# Patient Record
Sex: FEMALE | NOT HISPANIC OR LATINO | Employment: FULL TIME | URBAN - METROPOLITAN AREA
[De-identification: names, ages, dates, MRNs, and addresses within clinical notes are randomized per-mention and may not be internally consistent; named-entity substitution may affect disease eponyms.]

---

## 2021-05-07 ENCOUNTER — TRANSFERRED RECORDS (OUTPATIENT)
Dept: HEALTH INFORMATION MANAGEMENT | Facility: CLINIC | Age: 66
End: 2021-05-07

## 2021-05-11 ENCOUNTER — TRANSCRIBE ORDERS (OUTPATIENT)
Dept: OTHER | Age: 66
End: 2021-05-11

## 2021-05-11 DIAGNOSIS — C55 ADENOCARCINOMA OF UTERUS (H): Primary | ICD-10-CM

## 2021-05-12 ENCOUNTER — PRE VISIT (OUTPATIENT)
Dept: ONCOLOGY | Facility: CLINIC | Age: 66
End: 2021-05-12

## 2021-05-12 NOTE — TELEPHONE ENCOUNTER
RECORDS STATUS - ALL OTHER DIAGNOSIS      RECORDS RECEIVED FROM: [facility]   DATE RECEIVED:    NOTES STATUS DETAILS   OFFICE NOTE from referring provider CE - [facility] Dr. Dary Jones: 5/7/21   OFFICE NOTE from medical oncologist     DISCHARGE SUMMARY from hospital     DISCHARGE REPORT from the ER     OPERATIVE REPORT NA    MEDICATION LIST     CLINICAL TRIAL TREATMENTS TO DATE     LABS     PATHOLOGY REPORTS NA    ANYTHING RELATED TO DIAGNOSIS Epic/CE 5/4/21: PAP   GENONOMIC TESTING     TYPE:     IMAGING (NEED IMAGES & REPORT)     CT SCANS     MRI     MAMMO     ULTRASOUND PACS 5/5/21: [facility]   PET

## 2021-05-13 NOTE — TELEPHONE ENCOUNTER
RECORDS STATUS - ALL OTHER DIAGNOSIS      RECORDS RECEIVED FROM:[facility]/ [facility]/ [facility]    DATE RECEIVED: 5/17/2021   NOTES STATUS DETAILS   OFFICE NOTE from referring provider Complete Dary Mendoza MD   OFFICE NOTE from medical oncologist N/A - [facility] Office Visit 5/7/2021 ([facility]) Post- menopausal bleeding    DISCHARGE SUMMARY from hospital N/A    DISCHARGE REPORT from the ER     OPERATIVE REPORT Complete Pap Smear   [facility]  Case: [id number]         Adenocarcinoma, endometrial   MEDICATION LIST Complete CE   CLINICAL TRIAL TREATMENTS TO DATE     LABS     PATHOLOGY REPORTS N/A    ANYTHING RELATED TO DIAGNOSIS Complete    Requested- [facility]   Fax: [phone] Labs last updated in CE on 5/4/2021    Pap Smear   [facility]  Case: [id number]         Adenocarcinoma, endometrial   GENONOMIC TESTING     TYPE:     IMAGING (NEED IMAGES & REPORT)     CT SCANS     MRI     MAMMO     ULTRASOUND Complete- [facility] US Pelvis 5/5/2021    PET       Action    Action Taken 5/14/2021 9:09AM     I called rocio Frances - her phone went to .     I called [facility] to check on the cytology path slides  Ph: [phone]  #7 #2 - I left a detailed  requesting a call back.     I called [facility] Ph:[phone] - the pap smear/cytology slides are at [facility].

## 2021-05-15 NOTE — PROGRESS NOTES
Gynecologic Oncology Clinic - New Patient    Referring provider:    Dary Mendoza MD    Patient: Yvrose Andrade  : 1955    Date of Visit: May 17, 2021     Chief Complaint: endometrial cancer    History of Present Illness:  Yvrose Andrade is a 66 year old post-menopausal female who presents for newly diagnosed endometrial cancer.     Per review of her Family Medicine documentation and results, she presented to PCP with a week of vaginal bleeding and cramping. Ultrasound showed 1.5cm lining with probable anterior wall invasion. Pap obtained.    She was planned to undergo hysteroscopy, D&C with OB/Gyn; however, prior to planned procedure her Pap smear returned with endometrial adenocarcinoma and thus procedure was cancelled and she was referred here.    She is here with her fiance who aids in history. She has a history of stroke which has slowed her processing and speech. She corroborates above history. Continues to have very light bleeding. She has some cramping. No change in bladder or bowel habits. No chest pain, SOB, nausea, vomiting, LE swelling.     Review of Systems:  As per HPI, otherwise non-contributory.     Past Medical History  Past Medical History:   Diagnosis Date     Anxiety      Depression      Essential hypertension      Stroke (H)      Type 2 diabetes mellitus without complication, without long-term current use of insulin (H)        Past Surgical History  Past Surgical History:   Procedure Laterality Date     CORNEAL GRAFTS Bilateral      TONSILLECTOMY          OB/Gynecologic History:  g0  Menopause age 56, no prior episodes of bleeding  Last Pap Smear: 2021, endometrial cancer. History of abnormal: No    Social History  Social History     Tobacco Use     Smoking status: Never Smoker     Smokeless tobacco: Never Used   Substance Use Topics     Alcohol use: Yes     Frequency: Monthly or less     Drug use: Never   She works as a , generally overseeing self check out. She is engaged.  "Her fiance, Price, drives trucks, so is away sometimes. Her sister, Vandana, also helps with her care.     Family History  Family History   Problem Relation Age of Onset     Colon Cancer Father 58       Current Outpatient Medications   Medication Sig Dispense Refill     amLODIPine (NORVASC) 5 MG tablet Take 1 tablet by mouth once daily       Aspirin Buf,CaCarb-MgCarb-MgO, 81 MG TABS Take 1 tablet by mouth       calcium carbonate-vitamin D (OYSTER SHELL CALCIUM/D) 500-200 MG-UNIT tablet Take 1 tablet by mouth       citalopram (CELEXA) 20 MG tablet Take 1 tablet by mouth once daily       metFORMIN (GLUCOPHAGE-XR) 500 MG 24 hr tablet 1 tab with breakfast daily for one week then 1 tab twice a day with meals.       rosuvastatin (CRESTOR) 20 MG tablet TAKE 1 TABLET BY MOUTH AT BEDTIME            Allergies  Allergies   Allergen Reactions     Bees      bee stings        Preventive Care:  Mammogram: 8/25/2020  Colonoscopy: 2019, normal     Physical Exam:   BP (!) 144/73 (BP Location: Right arm)   Pulse 61   Temp 98  F (36.7  C) (Oral)   Resp 14   Ht 1.702 m (5' 7\")   Wt 71.8 kg (158 lb 6.4 oz)   SpO2 98%   BMI 24.81 kg/m    General appearance: Alert and oriented, no acute distress, well groomed  GI: Abdomen soft, non-tender, non-distended. No palpable masses  Skin: no skin changes or lesions  Psych: alert and oriented, appropriate affect  Lymph: No palpable lymphadenopathy in the neck, supraclavicular region, groin  Genitourinary:  External: anatomically normal appearing labia majora, minora, and clitoral ramsey. No lesions noted  Vagina: pale atrophic mucosa without lesions  Cervix: atrophic and nearly flush with the vagina, see below      Endometrial biopsy  Endometrial Biopsy    After patient identification was confirmed and informed consent was signed, a bivalve speculum was placed in the vagina for adequate visualization of the cervix.  The cervix was prepped with betadine. The cervix was grasped with a single-tooth " tenaculum to apply traction. Os finder was used and sequential attempt at dilation of the os was performed. Attempted endometrial biopsy, however, unable to pass into the endometrial canal, A scant amount of tissue was obtained, but given her diagnosis on Pap smear, the decision was made to send the tissue in the event it was able to provide a diagnosis. The tenaculum was removed, and the tenaculum site was made hemostatic with the application of pressure. The speculum was then removed. Specimen was sent for permanent pathology. The patient tolerated the procedure with moderate cramping.    Labs:   Component 13d ago   Case Report  Pap Smear                                         Case: [id number]                                   Authorizing Provider:  Celina Eddy PA-C    Collected:           05/04/2021 04:14 PM           Ordering Location:     [facility]   Received:            05/05/2021 04:14 PM                                  Clinic                                                                       First Screen:          Tamika Curiel                                                             Pathologist:           Melissa Rae MD                                                         Specimen:    Cervical Thin Prep with HPV Test Imager Screening, Cervix                               Interpretation  Adenocarcinoma, endometrial.Abnormal     Specimen Adequacy  Satisfactory for evaluation.  Endocervical/transformation zone component present.      Clinical Information  Abnormal Bleeding    LMP  10 years ago            Imaging:   EXAMINATION:  US PELVIS WITH IVT-NON OB    DATE: 5/5/2021 8:19 AM    CLINICAL DATA:  Postmenopausal bleeding    COMPARISON:  None.    TECHNIQUE:  Transabdominal and transvaginal sonography for better visualization of the endometrium.    FINDINGS: The uterus is anteflexed. The uterus measures 5.7 X 2.9 X 3.7 cm.  There is some fluid seen within the  endometrial cavity  The endometrium is heterogeneous and thickened irregularly measures 1.3 to 1.5 cm in thickness. It is hypervascular. It is difficult to define the anterior margin of the endometrium suggesting that this may be invading the myometrium. No fibroid or other abnormality is seen.    The right ovary measures 2 x 2 0.1 x 0.8 cm.  No right ovarian lesion is seen.  The left ovary measures 0.9 x 1.5 x 0.5 cm. No left ovarian lesion is seen. Normal blood flow seen to both ovaries. No free fluid is seen in the cul-de-sac.    Assessment:  Yvrose Andrade is a 66 year old with history of stroke with resultant speech deficits, HTN, and diabetes with recent Pap smear showing endometrial adeocarcinoma and ultrasound confirming endometrial origin.    Plan:   Endometrial cancer: We reviewed the pathophysiology of uterine cancer. We discussed this diagnosis and in particular her diagnosis, which was made with cytology on Pap smear. Because of this, we do not have a grade for her cancer. Biopsy was attempted in clinic, but cervical stenosis precluded entry into the canal and I suspect it may be non-diagnostic. We discussed the effect of grade on risk of cancer spread. Grade can sometimes be needed to guide intra-operative surgical decision making, but this information could be obtained on frozen section if needed, so I do not think it is necessary to perform D&C prior to proceeding with surgery. Her Pap smear cytology suggest endometrial origin and her exam and imaging are extremely consistent with a uterine cancer.   We discussed stage as determination of if the cancer has spread beyond this uterus. This is determined surgically. Other options for treatment include surgery or radiation therapy. Hormones are an option in some cases but are not standard of care. We discussed risks/benefits. Her co-morbid conditions that increase her surgical risk are her HTN, history of stroke, elevated BMI, and diabetes. However,  based on history and exam, I feel she is an appropriate surgical risk with clearance by her primary care provider.   I recommend total laparoscopic hysterectomy, bilateral salpingo-oophorectomy, ICG dye injection with sentinel lymph node removal, with possible full lymphadenectomy. This can generally be accomplished through a minimally invasive approach. Complications at the time of surgery could require conversion to an open procedure. We discussed surgical risks as below.  We reviewed the risks of surgery including risk of infection, bleeding, damage to surrounding structures or organs including bladder, ureters, bowel, blood vessels, or nerves. Risk of need for additional procedures or blood transfusion if complications. Blood transfusions carry additional risks of transfusion reactions, damaged to lung/kidneys, fevers, transmission of infectious disease, death. Risk of blood clot/pulmonary embolism, which can be fatal. We reviewed sentinel lymph node procedure and ~15% risk of needing full lymph node dissection. Lymph node resection brings risks of numbness of mons, upper thigh, vulva and lymphedema, which we think are reduced with sentinel lymph node dissection. I also reviewed the use of vaginal manipulator and vaginal specimen removal with risk of vaginal laceration/repair. I discussed the risk of vaginal cuff dehiscence (separation) or wound dehiscence with need for further surgery.   Pre-operative work-up: pre-operative exam will be completed by her primary care doctor or PAC if she does not have a PCP. No further imaging is necessary prior to surgery. Pre-operative teaching was completed by RN in clinic. She knows to expect a call from scheduling and/or pre-operative nursing with more specific instructions on date/time of surgery. We reviewed Covid testing and possibility of surgery being delayed with positive result.     I spent a total of 75 minutes on day of service including face to face time,  documentation, chart review/coordination of care.    Saeid Casillas MD

## 2021-05-17 ENCOUNTER — PRE VISIT (OUTPATIENT)
Dept: ONCOLOGY | Facility: CLINIC | Age: 66
End: 2021-05-17

## 2021-05-17 ENCOUNTER — ONCOLOGY VISIT (OUTPATIENT)
Dept: ONCOLOGY | Facility: CLINIC | Age: 66
End: 2021-05-17
Attending: OBSTETRICS & GYNECOLOGY
Payer: COMMERCIAL

## 2021-05-17 VITALS
DIASTOLIC BLOOD PRESSURE: 73 MMHG | HEART RATE: 61 BPM | SYSTOLIC BLOOD PRESSURE: 144 MMHG | WEIGHT: 158.4 LBS | OXYGEN SATURATION: 98 % | RESPIRATION RATE: 14 BRPM | HEIGHT: 67 IN | BODY MASS INDEX: 24.86 KG/M2 | TEMPERATURE: 98 F

## 2021-05-17 DIAGNOSIS — I63.81 RIGHT THALAMIC STROKE (H): ICD-10-CM

## 2021-05-17 DIAGNOSIS — C54.1 ENDOMETRIAL ADENOCARCINOMA (H): Primary | ICD-10-CM

## 2021-05-17 DIAGNOSIS — I10 ESSENTIAL HYPERTENSION: ICD-10-CM

## 2021-05-17 DIAGNOSIS — E11.9 TYPE 2 DIABETES MELLITUS WITHOUT COMPLICATION, WITHOUT LONG-TERM CURRENT USE OF INSULIN (H): ICD-10-CM

## 2021-05-17 PROBLEM — M85.80 OSTEOPENIA: Status: ACTIVE | Noted: 2019-12-20

## 2021-05-17 PROBLEM — F41.8 DEPRESSION WITH ANXIETY: Status: ACTIVE | Noted: 2019-12-20

## 2021-05-17 PROBLEM — E78.5 HYPERLIPIDEMIA: Status: ACTIVE | Noted: 2019-12-12

## 2021-05-17 PROCEDURE — 88305 TISSUE EXAM BY PATHOLOGIST: CPT | Performed by: OBSTETRICS & GYNECOLOGY

## 2021-05-17 PROCEDURE — 88342 IMHCHEM/IMCYTCHM 1ST ANTB: CPT | Performed by: OBSTETRICS & GYNECOLOGY

## 2021-05-17 PROCEDURE — 99205 OFFICE O/P NEW HI 60 MIN: CPT | Mod: 25 | Performed by: OBSTETRICS & GYNECOLOGY

## 2021-05-17 PROCEDURE — 58100 BIOPSY OF UTERUS LINING: CPT | Performed by: OBSTETRICS & GYNECOLOGY

## 2021-05-17 RX ORDER — ROSUVASTATIN CALCIUM 20 MG/1
TABLET, COATED ORAL
COMMUNITY
Start: 2020-02-05

## 2021-05-17 RX ORDER — CITALOPRAM HYDROBROMIDE 20 MG/1
TABLET ORAL
COMMUNITY
Start: 2019-12-20

## 2021-05-17 RX ORDER — HEPARIN SODIUM 10000 [USP'U]/ML
5000 INJECTION, SOLUTION INTRAVENOUS; SUBCUTANEOUS
Status: CANCELLED | OUTPATIENT
Start: 2021-05-17

## 2021-05-17 RX ORDER — METFORMIN HCL 500 MG
TABLET, EXTENDED RELEASE 24 HR ORAL
COMMUNITY
Start: 2021-04-13

## 2021-05-17 RX ORDER — AMLODIPINE BESYLATE 5 MG/1
TABLET ORAL
COMMUNITY
Start: 2020-01-14

## 2021-05-17 SDOH — HEALTH STABILITY: MENTAL HEALTH: HOW OFTEN DO YOU HAVE 6 OR MORE DRINKS ON ONE OCCASION?: NOT ASKED

## 2021-05-17 SDOH — HEALTH STABILITY: MENTAL HEALTH: HOW MANY STANDARD DRINKS CONTAINING ALCOHOL DO YOU HAVE ON A TYPICAL DAY?: NOT ASKED

## 2021-05-17 SDOH — HEALTH STABILITY: MENTAL HEALTH: HOW OFTEN DO YOU HAVE A DRINK CONTAINING ALCOHOL?: MONTHLY OR LESS

## 2021-05-17 ASSESSMENT — MIFFLIN-ST. JEOR: SCORE: 1301.13

## 2021-05-17 ASSESSMENT — PAIN SCALES - GENERAL: PAINLEVEL: NO PAIN (0)

## 2021-05-17 NOTE — NURSING NOTE
[facility]:  Patient Education Note    Relevant Diagnosis:  Endometrial Cancer    Procedure:  Total laparoscopic hysterectomy, bilateral salpingo-oophorectomy, bilateral sentinel lymph node dissection    Person(s) involved in teaching:  Patient and Price sahni    Motivation Level:    Asks Questions:   Yes  Eager to Learn:  Yes  Cooperative:  Yes  Receptive (willing/able to accept information):  Yes  Comments:  None    Patient demonstrates understanding of the following:  Reason for the appointment, diagnosis and treatment plan:  Yes  Knowledge of proper use of medications and conditions for which they are ordered (with special attention to potential side effects or drug interactions):  Yes  Which situations necessitate calling provider and whom to contact:  Yes    Teaching Concerns:  No    Education/Instructional Materials Used/Given:     Before Your Surgery Booklet ([employer])  Showering Before Surgery, CHG prep provided  Adult Pain Assessment Tool  Lymphedema: A Patient Resource Guide  Hysterectomy Guidelines   Home Care After Gynecologic Surgery  [facility]  Accommodations Brochure   Phone numbers for [facility] and After-Hours Line provided to patient    Pre-op tests:     COVID-19 Testing: Ordered, appointment scheduling is pending confirmation of surgery date.    Other: N/A    Pre-op appointment: Patient will need to schedule a pre-op exam with her PCP within 30 days prior to surgery, patient and finance are aware of this.    Post-op appointment: 1-2 weeks after surgery, appointment scheduling is pending confirmation of surgery date.    Time spent teaching with patient:  25 minutes    Surgery scheduling team at [facility] notified.  Per MD, patient will sign surgery consents the day of surgery.  Patient signed Hysterectomy Acknowledgement Statement form while in clinic today, and this was sent to [facility] and faxed to pre-admissions team.    Nasir Barriga,  RN, BSN, OCN  Oncology Care Coordinator  [facility]

## 2021-05-17 NOTE — NURSING NOTE
"Oncology Rooming Note    May 17, 2021 11:00 AM   Yvrose Andrade is a 66 year old female who presents for:    Chief Complaint   Patient presents with     Oncology Clinic Visit     New Patient     Initial Vitals: BP (!) 144/73 (BP Location: Right arm)   Pulse 61   Temp 98  F (36.7  C) (Oral)   Resp 14   Ht 1.702 m (5' 7\")   Wt 71.8 kg (158 lb 6.4 oz)   SpO2 98%   BMI 24.81 kg/m   Estimated body mass index is 24.81 kg/m  as calculated from the following:    Height as of this encounter: 1.702 m (5' 7\").    Weight as of this encounter: 71.8 kg (158 lb 6.4 oz). Body surface area is 1.84 meters squared.  No Pain (0) Comment: Data Unavailable   No LMP recorded. Patient is postmenopausal.  Allergies reviewed: Yes  Medications reviewed: Yes    Medications: Medication refills not needed today.  Pharmacy name entered into [employer]: [facility] [address] [place] [address]      April LEONOR Rodrigues              "

## 2021-05-18 ENCOUNTER — TELEPHONE (OUTPATIENT)
Dept: ONCOLOGY | Facility: CLINIC | Age: 66
End: 2021-05-18

## 2021-05-18 DIAGNOSIS — Z11.59 ENCOUNTER FOR SCREENING FOR OTHER VIRAL DISEASES: ICD-10-CM

## 2021-05-18 PROBLEM — C54.1 ENDOMETRIAL ADENOCARCINOMA (H): Status: ACTIVE | Noted: 2021-05-18

## 2021-05-18 NOTE — TELEPHONE ENCOUNTER
RN: MG Carissa patient     Surgery is scheduled with Dr. Casillas on 6/2 at [place].  Scheduled per patient.    H&P: to be completed by PCP    Additional appointments:   NO ADDITIONAL APPOINTMENTS NEEDED AT THIS TIME    COVID-19 test: 6/1 at [place]    Post-op: will be scheduled by the clinic.     The RN completed the education regarding the surgery.     Patient will receive a phone call from pre-admission nurses 1-2 days prior to surgery with arrival and start time.    The surgery packet was provided by the RN during appointment.    Patient will complete COVID-19 test that was scheduled by surgical coordinator 2-4 days prior to surgery.     I called the patient and was able to confirm the scheduled information above.

## 2021-05-21 ENCOUNTER — TELEPHONE (OUTPATIENT)
Dept: ONCOLOGY | Facility: CLINIC | Age: 66
End: 2021-05-21

## 2021-05-21 NOTE — TELEPHONE ENCOUNTER
LVM for sister (Vandana) with all information regarding surgery with Dr. Casillas.    Provided direct contact number.

## 2021-05-24 ENCOUNTER — TELEPHONE (OUTPATIENT)
Dept: ONCOLOGY | Facility: CLINIC | Age: 66
End: 2021-05-24

## 2021-05-24 LAB — COPATH REPORT: NORMAL

## 2021-05-24 NOTE — TELEPHONE ENCOUNTER
I called and spoke with Yvrose's sister, Vandana (authorization on file). I answered her questions about surgery and Yvrose's diagnosis. The biggest question we don't know the answer to pre-operatively is what grade Yvrose's cancer is, which limits my ability to prognosticate to a certain degree. Biopsy was inconclusive with regards to grade. We discussed options including frozen section at the time of surgery versus performing ipsilateral full lymphadenectomy in the event sentinel node does not map. I will make that determination at the time of surgery based upon intra-operative findings and how the patient is tolerating surgery. If she is tolerating surgery and it seems feasible, it may be prudent to perform para-aortic lymph node dissection in the event one side doesn't map given the question of high grade raised on biopsy, but we will await final staining results on that and again see what surgery brings.    I did review medications:  Yvrose should HOLD metformin day of surgery  Yvrose should HOLD all vitamins/minerals 7 days prior   Yvrose may TAKE or HOLD her low dose aspirin (if her PCP recommends taking, it is safe for her to continue this cass-operatively).  Yvrose should TAKE her amlodipine and rosuvastatin and citalopram day of surgery as per usual.    Saeid Casillas MD     Gynecologic Oncology

## 2021-05-25 ENCOUNTER — PATIENT OUTREACH (OUTPATIENT)
Dept: ONCOLOGY | Facility: CLINIC | Age: 66
End: 2021-05-25

## 2021-05-25 ENCOUNTER — TRANSFERRED RECORDS (OUTPATIENT)
Dept: HEALTH INFORMATION MANAGEMENT | Facility: CLINIC | Age: 66
End: 2021-05-25

## 2021-05-25 DIAGNOSIS — C54.1 ENDOMETRIAL ADENOCARCINOMA (H): Primary | ICD-10-CM

## 2021-05-25 NOTE — PROGRESS NOTES
[facility]:  Care Coordination Note    Dr. Casillas is requesting to schedule this patient for a CT C/A/P prior to surgery if possible, due to recent biopsy findings showing concern for possible high grade cancer.  Dr. Casillas is agreeable with the patient having this done closer to home if this is easier for her.    Writer placed telephone call to patient this afternoon to provide update that we would like to schedule her for a CT scan prior to surgery.  Patient is agreeable with this plan.  Patient requests to have the scan scheduled at the [facility] if possible.  She also asked that her sister, Vandana, be called with an update regarding this plan - sister was called as requested (voicemail message was left on sister's phone).      Writer then placed telephone call to [facility], confirmed their fax number for CT orders to be sent ([phone]), and verified that someone from their office will contact patient directly to schedule the CT scan appointment once they receive the orders and verify insurance coverage.    Writer will continue to follow for updates regarding CT scan appointment scheduling, as well as results.     Nasir Barriga, RN, BSN, OCN  Oncology Care Coordinator  [facility]

## 2021-05-27 ENCOUNTER — PATIENT OUTREACH (OUTPATIENT)
Dept: ONCOLOGY | Facility: CLINIC | Age: 66
End: 2021-05-27

## 2021-05-28 RX ORDER — FLUTICASONE PROPIONATE 50 MCG
1 SPRAY, SUSPENSION (ML) NASAL PRN
COMMUNITY

## 2021-05-28 NOTE — PROGRESS NOTES
[facility]:  Care Coordination Note    Writer placed telephone call to [facility] this afternoon, to follow-up on the status of patient's CT scan approval and scheduling.  Writer was informed that they have tried reaching patient to schedule, however patient did not answer and her voicemail box was full.  Advised them to please call patient's sister, Vandana, instead and provided her number.  They will call sister ASAP to assist with scheduling.     Nasir Barriga, RN, BSN, OCN  Oncology Care Coordinator  [facility]

## 2021-05-30 ENCOUNTER — ANESTHESIA EVENT (OUTPATIENT)
Dept: SURGERY | Facility: CLINIC | Age: 66
End: 2021-05-30
Payer: COMMERCIAL

## 2021-06-01 ENCOUNTER — PATIENT OUTREACH (OUTPATIENT)
Dept: ONCOLOGY | Facility: CLINIC | Age: 66
End: 2021-06-01

## 2021-06-01 DIAGNOSIS — Z11.59 ENCOUNTER FOR SCREENING FOR OTHER VIRAL DISEASES: ICD-10-CM

## 2021-06-01 LAB
LABORATORY COMMENT REPORT: NORMAL
SARS-COV-2 RNA RESP QL NAA+PROBE: NEGATIVE
SARS-COV-2 RNA RESP QL NAA+PROBE: NORMAL
SPECIMEN SOURCE: NORMAL
SPECIMEN SOURCE: NORMAL

## 2021-06-01 PROCEDURE — U0005 INFEC AGEN DETEC AMPLI PROBE: HCPCS | Performed by: OBSTETRICS & GYNECOLOGY

## 2021-06-01 PROCEDURE — U0003 INFECTIOUS AGENT DETECTION BY NUCLEIC ACID (DNA OR RNA); SEVERE ACUTE RESPIRATORY SYNDROME CORONAVIRUS 2 (SARS-COV-2) (CORONAVIRUS DISEASE [COVID-19]), AMPLIFIED PROBE TECHNIQUE, MAKING USE OF HIGH THROUGHPUT TECHNOLOGIES AS DESCRIBED BY CMS-2020-01-R: HCPCS | Performed by: OBSTETRICS & GYNECOLOGY

## 2021-06-01 NOTE — PROGRESS NOTES
[facility]:  Care Coordination Note    Received CT C/A/P results report from this morning via fax from [facility].  CT images have been pushed to [place] PACS system.   STAT archiving request was sent to  imaging services this afternoon.  CT results report is available in Care Everywhere.  Dr. Casillas was updated.     Nasir Barriga RN, BSN, OCN  Oncology Care Coordinator  [facility]

## 2021-06-01 NOTE — ANESTHESIA PREPROCEDURE EVALUATION
Anesthesia Pre-Procedure Evaluation    Patient: Yvrose Andrade   MRN: [id number] : 1955        Preoperative Diagnosis: Endometrial adenocarcinoma (H) [C54.1]   Procedure : Procedure(s):  Total laparoscopic hysterectomy, bilateral salpingo-oophorectomy, bilateral sentinel lymph node dissection     Yvrose Andrade is a 66 year old with history of stroke with resultant speech deficits, HTN, and diabetes with recent Pap smear showing endometrial adeocarcinoma and ultrasound confirming endometrial origin.  Her history of stroke has slowed her processing and speech.     Past Medical History:   Diagnosis Date     Anxiety      Depression      Essential hypertension      Stroke (H)      Type 2 diabetes mellitus without complication, without long-term current use of insulin (H)       Past Surgical History:   Procedure Laterality Date     CORNEAL GRAFTS Bilateral      TONSILLECTOMY        Allergies   Allergen Reactions     Bees Anaphylaxis      Social History     Tobacco Use     Smoking status: Never Smoker     Smokeless tobacco: Never Used   Substance Use Topics     Alcohol use: Yes     Frequency: Monthly or less     Comment: once month      Wt Readings from Last 1 Encounters:   21 71.8 kg (158 lb 6.4 oz)        Anesthesia Evaluation   Pt has had prior anesthetic. Type: MAC.    No history of anesthetic complications       ROS/MED HX  ENT/Pulmonary:  - neg pulmonary ROS     Neurologic:     (+) CVA, date: 2019, with deficits, - numbness in left thumb and index finger, final motor movement difficulty, stairs are difficult. TIA,     Cardiovascular:     (+) hypertension-----    METS/Exercise Tolerance:     Hematologic:  - neg hematologic  ROS     Musculoskeletal:  - neg musculoskeletal ROS     GI/Hepatic:  - neg GI/hepatic ROS     Renal/Genitourinary:  - neg Renal ROS     Endo:     (+) type II DM, Last HgA1c: 6.3, date: , Not using insulin, not previously admitted for DM/DKA.     Psychiatric/Substance Use:   - neg psychiatric ROS     Infectious Disease:  - neg infectious disease ROS     Malignancy:   (+) Malignancy, History of Other.Other CA endometrial Active status post Surgery.    Other:            Physical Exam    Airway        Mallampati: II   TM distance: > 3 FB      Respiratory Devices and Support         Dental  no notable dental history         Cardiovascular   cardiovascular exam normal          Pulmonary   pulmonary exam normal            Other findings:  strength reduced on left    OUTSIDE LABS:  CBC: No results found for: WBC, HGB, HCT, PLT  BMP: No results found for: NA, POTASSIUM, CHLORIDE, CO2, BUN, CR, GLC  COAGS: No results found for: PTT, INR, FIBR  POC: No results found for: BGM, HCG, HCGS  HEPATIC: No results found for: ALBUMIN, PROTTOTAL, ALT, AST, GGT, ALKPHOS, BILITOTAL, BILIDIRECT, XU  OTHER: No results found for: PH, LACT, A1C, LISSETH, PHOS, MAG, LIPASE, AMYLASE, TSH, T4, T3, CRP, SED    Anesthesia Plan    ASA Status:  2   NPO Status:  NPO Appropriate    Anesthesia Type: General.     - Airway: ETT   Induction: Intravenous.   Maintenance: Balanced.   Techniques and Equipment:     - Lines/Monitors: 2nd IV, BIS     Consents    Anesthesia Plan(s) and associated risks, benefits, and realistic alternatives discussed. Questions answered and patient/representative(s) expressed understanding.     - Discussed with:  Patient      - Extended Intubation/Ventilatory Support Discussed: No.      - Patient is DNR/DNI Status: No    Use of blood products discussed: Yes.     - Discussed with: Patient.     - Consented: consented to blood products            Reason for refusal: other.     Postoperative Care    Pain management: Multi-modal analgesia.   PONV prophylaxis: Ondansetron (or other 5HT-3), Dexamethasone or Solumedrol     Comments:                Matias Velez MD

## 2021-06-02 ENCOUNTER — HOSPITAL ENCOUNTER (OUTPATIENT)
Facility: CLINIC | Age: 66
Discharge: HOME | End: 2021-06-02
Attending: OBSTETRICS & GYNECOLOGY | Admitting: OBSTETRICS & GYNECOLOGY
Payer: COMMERCIAL

## 2021-06-02 ENCOUNTER — ANCILLARY PROCEDURE (OUTPATIENT)
Dept: ULTRASOUND IMAGING | Facility: CLINIC | Age: 66
End: 2021-06-02
Attending: STUDENT IN AN ORGANIZED HEALTH CARE EDUCATION/TRAINING PROGRAM
Payer: COMMERCIAL

## 2021-06-02 ENCOUNTER — ANESTHESIA (OUTPATIENT)
Dept: SURGERY | Facility: CLINIC | Age: 66
End: 2021-06-02
Payer: COMMERCIAL

## 2021-06-02 VITALS
DIASTOLIC BLOOD PRESSURE: 59 MMHG | HEART RATE: 45 BPM | SYSTOLIC BLOOD PRESSURE: 117 MMHG | WEIGHT: 157.72 LBS | TEMPERATURE: 97.3 F | BODY MASS INDEX: 24.75 KG/M2 | OXYGEN SATURATION: 95 % | RESPIRATION RATE: 16 BRPM | HEIGHT: 67 IN

## 2021-06-02 DIAGNOSIS — C54.1 ENDOMETRIAL ADENOCARCINOMA (H): ICD-10-CM

## 2021-06-02 DIAGNOSIS — Z98.890 S/P LAPAROSCOPY: Primary | ICD-10-CM

## 2021-06-02 LAB
ABO + RH BLD: NORMAL
ABO + RH BLD: NORMAL
ANION GAP SERPL CALCULATED.3IONS-SCNC: 7 MMOL/L (ref 3–14)
BLD GP AB SCN SERPL QL: NORMAL
BLOOD BANK CMNT PATIENT-IMP: NORMAL
BUN SERPL-MCNC: 24 MG/DL (ref 7–30)
CALCIUM SERPL-MCNC: 9 MG/DL (ref 8.5–10.1)
CHLORIDE SERPL-SCNC: 108 MMOL/L (ref 94–109)
CO2 SERPL-SCNC: 25 MMOL/L (ref 20–32)
CREAT SERPL-MCNC: 0.72 MG/DL (ref 0.52–1.04)
GFR SERPL CREATININE-BSD FRML MDRD: 88 ML/MIN/{1.73_M2}
GLUCOSE BLDC GLUCOMTR-MCNC: 109 MG/DL (ref 70–99)
GLUCOSE BLDC GLUCOMTR-MCNC: 168 MG/DL (ref 70–99)
GLUCOSE SERPL-MCNC: 103 MG/DL (ref 70–99)
HGB BLD-MCNC: 14.2 G/DL (ref 11.7–15.7)
INTERPRETATION ECG - MUSE: NORMAL
POTASSIUM SERPL-SCNC: 3.4 MMOL/L (ref 3.4–5.3)
SODIUM SERPL-SCNC: 140 MMOL/L (ref 133–144)
SPECIMEN EXP DATE BLD: NORMAL

## 2021-06-02 PROCEDURE — 999N001020 HC STATISTIC H-SEND OUTS PREP: Performed by: OBSTETRICS & GYNECOLOGY

## 2021-06-02 PROCEDURE — 88360 TUMOR IMMUNOHISTOCHEM/MANUAL: CPT | Mod: 26 | Performed by: PATHOLOGY

## 2021-06-02 PROCEDURE — 88341 IMHCHEM/IMCYTCHM EA ADD ANTB: CPT | Mod: TC | Performed by: OBSTETRICS & GYNECOLOGY

## 2021-06-02 PROCEDURE — 93010 ELECTROCARDIOGRAM REPORT: CPT | Mod: 59 | Performed by: INTERNAL MEDICINE

## 2021-06-02 PROCEDURE — 250N000011 HC RX IP 250 OP 636: Performed by: STUDENT IN AN ORGANIZED HEALTH CARE EDUCATION/TRAINING PROGRAM

## 2021-06-02 PROCEDURE — 710N000012 HC RECOVERY PHASE 2, PER MINUTE: Performed by: OBSTETRICS & GYNECOLOGY

## 2021-06-02 PROCEDURE — 82962 GLUCOSE BLOOD TEST: CPT

## 2021-06-02 PROCEDURE — 999N000054 HC STATISTIC EKG NON-CHARGEABLE

## 2021-06-02 PROCEDURE — 80048 BASIC METABOLIC PNL TOTAL CA: CPT | Performed by: STUDENT IN AN ORGANIZED HEALTH CARE EDUCATION/TRAINING PROGRAM

## 2021-06-02 PROCEDURE — 88342 IMHCHEM/IMCYTCHM 1ST ANTB: CPT | Mod: TC | Performed by: OBSTETRICS & GYNECOLOGY

## 2021-06-02 PROCEDURE — 88307 TISSUE EXAM BY PATHOLOGIST: CPT | Mod: 26 | Performed by: PATHOLOGY

## 2021-06-02 PROCEDURE — 86900 BLOOD TYPING SEROLOGIC ABO: CPT | Performed by: STUDENT IN AN ORGANIZED HEALTH CARE EDUCATION/TRAINING PROGRAM

## 2021-06-02 PROCEDURE — 250N000011 HC RX IP 250 OP 636: Performed by: ANESTHESIOLOGY

## 2021-06-02 PROCEDURE — 88341 IMHCHEM/IMCYTCHM EA ADD ANTB: CPT | Mod: 26 | Performed by: PATHOLOGY

## 2021-06-02 PROCEDURE — 250N000009 HC RX 250: Performed by: ANESTHESIOLOGY

## 2021-06-02 PROCEDURE — 999N000141 HC STATISTIC PRE-PROCEDURE NURSING ASSESSMENT: Performed by: OBSTETRICS & GYNECOLOGY

## 2021-06-02 PROCEDURE — 250N000013 HC RX MED GY IP 250 OP 250 PS 637: Performed by: STUDENT IN AN ORGANIZED HEALTH CARE EDUCATION/TRAINING PROGRAM

## 2021-06-02 PROCEDURE — 85018 HEMOGLOBIN: CPT | Performed by: STUDENT IN AN ORGANIZED HEALTH CARE EDUCATION/TRAINING PROGRAM

## 2021-06-02 PROCEDURE — 88307 TISSUE EXAM BY PATHOLOGIST: CPT | Mod: TC | Performed by: OBSTETRICS & GYNECOLOGY

## 2021-06-02 PROCEDURE — 86850 RBC ANTIBODY SCREEN: CPT | Performed by: STUDENT IN AN ORGANIZED HEALTH CARE EDUCATION/TRAINING PROGRAM

## 2021-06-02 PROCEDURE — 250N000009 HC RX 250: Performed by: STUDENT IN AN ORGANIZED HEALTH CARE EDUCATION/TRAINING PROGRAM

## 2021-06-02 PROCEDURE — 86901 BLOOD TYPING SEROLOGIC RH(D): CPT | Performed by: STUDENT IN AN ORGANIZED HEALTH CARE EDUCATION/TRAINING PROGRAM

## 2021-06-02 PROCEDURE — 88377 M/PHMTRC ALYS ISHQUANT/SEMIQ: CPT | Mod: 26 | Performed by: MEDICAL GENETICS

## 2021-06-02 PROCEDURE — 88360 TUMOR IMMUNOHISTOCHEM/MANUAL: CPT | Mod: TC | Performed by: OBSTETRICS & GYNECOLOGY

## 2021-06-02 PROCEDURE — 258N000003 HC RX IP 258 OP 636: Performed by: ANESTHESIOLOGY

## 2021-06-02 PROCEDURE — 88342 IMHCHEM/IMCYTCHM 1ST ANTB: CPT | Mod: 26 | Performed by: PATHOLOGY

## 2021-06-02 PROCEDURE — 88377 M/PHMTRC ALYS ISHQUANT/SEMIQ: CPT | Mod: TC | Performed by: PATHOLOGY

## 2021-06-02 PROCEDURE — 710N000010 HC RECOVERY PHASE 1, LEVEL 2, PER MIN: Performed by: OBSTETRICS & GYNECOLOGY

## 2021-06-02 PROCEDURE — 250N000011 HC RX IP 250 OP 636: Performed by: OBSTETRICS & GYNECOLOGY

## 2021-06-02 PROCEDURE — 250N000025 HC SEVOFLURANE, PER MIN: Performed by: OBSTETRICS & GYNECOLOGY

## 2021-06-02 PROCEDURE — 999N001019 HC STATISTIC H-FISH PROCESS B/S: Performed by: OBSTETRICS & GYNECOLOGY

## 2021-06-02 PROCEDURE — 272N000001 HC OR GENERAL SUPPLY STERILE: Performed by: OBSTETRICS & GYNECOLOGY

## 2021-06-02 PROCEDURE — 360N000077 HC SURGERY LEVEL 4, PER MIN: Performed by: OBSTETRICS & GYNECOLOGY

## 2021-06-02 PROCEDURE — 370N000017 HC ANESTHESIA TECHNICAL FEE, PER MIN: Performed by: OBSTETRICS & GYNECOLOGY

## 2021-06-02 PROCEDURE — 36415 COLL VENOUS BLD VENIPUNCTURE: CPT | Performed by: STUDENT IN AN ORGANIZED HEALTH CARE EDUCATION/TRAINING PROGRAM

## 2021-06-02 RX ORDER — MEPERIDINE HYDROCHLORIDE 25 MG/ML
12.5 INJECTION INTRAMUSCULAR; INTRAVENOUS; SUBCUTANEOUS
Status: DISCONTINUED | OUTPATIENT
Start: 2021-06-02 | End: 2021-06-02 | Stop reason: HOSPADM

## 2021-06-02 RX ORDER — PHENYLEPHRINE HYDROCHLORIDE 10 MG/ML
INJECTION INTRAVENOUS PRN
Status: DISCONTINUED | OUTPATIENT
Start: 2021-06-02 | End: 2021-06-02

## 2021-06-02 RX ORDER — GLYCOPYRROLATE 0.2 MG/ML
INJECTION, SOLUTION INTRAMUSCULAR; INTRAVENOUS PRN
Status: DISCONTINUED | OUTPATIENT
Start: 2021-06-02 | End: 2021-06-02

## 2021-06-02 RX ORDER — FENTANYL CITRATE 50 UG/ML
25-50 INJECTION, SOLUTION INTRAMUSCULAR; INTRAVENOUS
Status: DISCONTINUED | OUTPATIENT
Start: 2021-06-02 | End: 2021-06-02 | Stop reason: HOSPADM

## 2021-06-02 RX ORDER — FLUMAZENIL 0.1 MG/ML
0.2 INJECTION, SOLUTION INTRAVENOUS
Status: DISCONTINUED | OUTPATIENT
Start: 2021-06-02 | End: 2021-06-02 | Stop reason: HOSPADM

## 2021-06-02 RX ORDER — ACETAMINOPHEN 325 MG/1
975 TABLET ORAL ONCE
Status: DISCONTINUED | OUTPATIENT
Start: 2021-06-02 | End: 2021-06-02 | Stop reason: HOSPADM

## 2021-06-02 RX ORDER — GABAPENTIN 300 MG/1
300 CAPSULE ORAL ONCE
Status: COMPLETED | OUTPATIENT
Start: 2021-06-02 | End: 2021-06-02

## 2021-06-02 RX ORDER — ONDANSETRON 2 MG/ML
INJECTION INTRAMUSCULAR; INTRAVENOUS PRN
Status: DISCONTINUED | OUTPATIENT
Start: 2021-06-02 | End: 2021-06-02

## 2021-06-02 RX ORDER — OXYCODONE HYDROCHLORIDE 5 MG/1
5 TABLET ORAL EVERY 6 HOURS PRN
Qty: 6 TABLET | Refills: 0 | Status: SHIPPED | OUTPATIENT
Start: 2021-06-02

## 2021-06-02 RX ORDER — HYDROMORPHONE HYDROCHLORIDE 1 MG/ML
.3-.5 INJECTION, SOLUTION INTRAMUSCULAR; INTRAVENOUS; SUBCUTANEOUS EVERY 10 MIN PRN
Status: DISCONTINUED | OUTPATIENT
Start: 2021-06-02 | End: 2021-06-02 | Stop reason: HOSPADM

## 2021-06-02 RX ORDER — EPHEDRINE SULFATE 50 MG/ML
INJECTION, SOLUTION INTRAMUSCULAR; INTRAVENOUS; SUBCUTANEOUS PRN
Status: DISCONTINUED | OUTPATIENT
Start: 2021-06-02 | End: 2021-06-02

## 2021-06-02 RX ORDER — NALOXONE HYDROCHLORIDE 0.4 MG/ML
0.4 INJECTION, SOLUTION INTRAMUSCULAR; INTRAVENOUS; SUBCUTANEOUS
Status: DISCONTINUED | OUTPATIENT
Start: 2021-06-02 | End: 2021-06-02 | Stop reason: HOSPADM

## 2021-06-02 RX ORDER — ACETAMINOPHEN 325 MG/1
650 TABLET ORAL EVERY 6 HOURS
Qty: 24 TABLET | Refills: 0 | Status: SHIPPED | OUTPATIENT
Start: 2021-06-02

## 2021-06-02 RX ORDER — ONDANSETRON 4 MG/1
4 TABLET, ORALLY DISINTEGRATING ORAL EVERY 30 MIN PRN
Status: DISCONTINUED | OUTPATIENT
Start: 2021-06-02 | End: 2021-06-02 | Stop reason: HOSPADM

## 2021-06-02 RX ORDER — HEPARIN SODIUM 5000 [USP'U]/.5ML
5000 INJECTION, SOLUTION INTRAVENOUS; SUBCUTANEOUS
Status: COMPLETED | OUTPATIENT
Start: 2021-06-02 | End: 2021-06-02

## 2021-06-02 RX ORDER — PROPOFOL 10 MG/ML
INJECTION, EMULSION INTRAVENOUS PRN
Status: DISCONTINUED | OUTPATIENT
Start: 2021-06-02 | End: 2021-06-02

## 2021-06-02 RX ORDER — IBUPROFEN 600 MG/1
600 TABLET, FILM COATED ORAL EVERY 6 HOURS
Qty: 30 TABLET | Refills: 0 | Status: SHIPPED | OUTPATIENT
Start: 2021-06-02

## 2021-06-02 RX ORDER — LABETALOL HYDROCHLORIDE 5 MG/ML
10 INJECTION, SOLUTION INTRAVENOUS
Status: DISCONTINUED | OUTPATIENT
Start: 2021-06-02 | End: 2021-06-02 | Stop reason: HOSPADM

## 2021-06-02 RX ORDER — AMOXICILLIN 250 MG
1-2 CAPSULE ORAL 2 TIMES DAILY
Qty: 30 TABLET | Refills: 0 | Status: SHIPPED | OUTPATIENT
Start: 2021-06-02

## 2021-06-02 RX ORDER — NALOXONE HYDROCHLORIDE 0.4 MG/ML
0.2 INJECTION, SOLUTION INTRAMUSCULAR; INTRAVENOUS; SUBCUTANEOUS
Status: DISCONTINUED | OUTPATIENT
Start: 2021-06-02 | End: 2021-06-02 | Stop reason: HOSPADM

## 2021-06-02 RX ORDER — CEFAZOLIN SODIUM 2 G/100ML
2 INJECTION, SOLUTION INTRAVENOUS
Status: COMPLETED | OUTPATIENT
Start: 2021-06-02 | End: 2021-06-02

## 2021-06-02 RX ORDER — CEFAZOLIN SODIUM 2 G/100ML
2 INJECTION, SOLUTION INTRAVENOUS SEE ADMIN INSTRUCTIONS
Status: DISCONTINUED | OUTPATIENT
Start: 2021-06-02 | End: 2021-06-02 | Stop reason: HOSPADM

## 2021-06-02 RX ORDER — DEXAMETHASONE SODIUM PHOSPHATE 10 MG/ML
INJECTION, SOLUTION INTRAMUSCULAR; INTRAVENOUS
Status: COMPLETED | OUTPATIENT
Start: 2021-06-02 | End: 2021-06-02

## 2021-06-02 RX ORDER — ACETAMINOPHEN 325 MG/1
975 TABLET ORAL ONCE
Status: COMPLETED | OUTPATIENT
Start: 2021-06-02 | End: 2021-06-02

## 2021-06-02 RX ORDER — IBUPROFEN 200 MG
800 TABLET ORAL ONCE
Status: COMPLETED | OUTPATIENT
Start: 2021-06-02 | End: 2021-06-02

## 2021-06-02 RX ORDER — ONDANSETRON 4 MG/1
4-8 TABLET, ORALLY DISINTEGRATING ORAL EVERY 8 HOURS PRN
Qty: 4 TABLET | Refills: 0 | Status: SHIPPED | OUTPATIENT
Start: 2021-06-02

## 2021-06-02 RX ORDER — SODIUM CHLORIDE, SODIUM LACTATE, POTASSIUM CHLORIDE, CALCIUM CHLORIDE 600; 310; 30; 20 MG/100ML; MG/100ML; MG/100ML; MG/100ML
INJECTION, SOLUTION INTRAVENOUS CONTINUOUS
Status: DISCONTINUED | OUTPATIENT
Start: 2021-06-02 | End: 2021-06-02 | Stop reason: HOSPADM

## 2021-06-02 RX ORDER — OXYCODONE HYDROCHLORIDE 5 MG/1
5 TABLET ORAL
Status: DISCONTINUED | OUTPATIENT
Start: 2021-06-02 | End: 2021-06-02 | Stop reason: HOSPADM

## 2021-06-02 RX ORDER — LIDOCAINE HYDROCHLORIDE 20 MG/ML
INJECTION, SOLUTION INFILTRATION; PERINEURAL PRN
Status: DISCONTINUED | OUTPATIENT
Start: 2021-06-02 | End: 2021-06-02

## 2021-06-02 RX ORDER — ONDANSETRON 2 MG/ML
4 INJECTION INTRAMUSCULAR; INTRAVENOUS EVERY 30 MIN PRN
Status: DISCONTINUED | OUTPATIENT
Start: 2021-06-02 | End: 2021-06-02 | Stop reason: HOSPADM

## 2021-06-02 RX ORDER — BUPIVACAINE HYDROCHLORIDE 2.5 MG/ML
INJECTION, SOLUTION EPIDURAL; INFILTRATION; INTRACAUDAL
Status: COMPLETED | OUTPATIENT
Start: 2021-06-02 | End: 2021-06-02

## 2021-06-02 RX ORDER — LIDOCAINE 40 MG/G
CREAM TOPICAL
Status: DISCONTINUED | OUTPATIENT
Start: 2021-06-02 | End: 2021-06-02 | Stop reason: HOSPADM

## 2021-06-02 RX ADMIN — FENTANYL CITRATE 50 MCG: 50 INJECTION, SOLUTION INTRAMUSCULAR; INTRAVENOUS at 07:35

## 2021-06-02 RX ADMIN — PROPOFOL 150 MG: 10 INJECTION, EMULSION INTRAVENOUS at 07:40

## 2021-06-02 RX ADMIN — FENTANYL CITRATE 50 MCG: 50 INJECTION, SOLUTION INTRAMUSCULAR; INTRAVENOUS at 09:48

## 2021-06-02 RX ADMIN — GABAPENTIN 300 MG: 300 CAPSULE ORAL at 06:16

## 2021-06-02 RX ADMIN — PHENYLEPHRINE HYDROCHLORIDE 100 MCG: 10 INJECTION INTRAVENOUS at 08:15

## 2021-06-02 RX ADMIN — LIDOCAINE HYDROCHLORIDE 100 MG: 20 INJECTION, SOLUTION INFILTRATION; PERINEURAL at 07:40

## 2021-06-02 RX ADMIN — ROCURONIUM BROMIDE 20 MG: 10 INJECTION INTRAVENOUS at 08:36

## 2021-06-02 RX ADMIN — CEFAZOLIN 2 G: 10 INJECTION, POWDER, FOR SOLUTION INTRAVENOUS at 08:01

## 2021-06-02 RX ADMIN — DEXAMETHASONE SODIUM PHOSPHATE 2 MG: 10 INJECTION, SOLUTION INTRAMUSCULAR; INTRAVENOUS at 07:28

## 2021-06-02 RX ADMIN — ROCURONIUM BROMIDE 60 MG: 10 INJECTION INTRAVENOUS at 07:40

## 2021-06-02 RX ADMIN — ACETAMINOPHEN 975 MG: 325 TABLET, FILM COATED ORAL at 06:16

## 2021-06-02 RX ADMIN — Medication 5 MG: at 09:27

## 2021-06-02 RX ADMIN — ONDANSETRON 4 MG: 2 INJECTION INTRAMUSCULAR; INTRAVENOUS at 09:55

## 2021-06-02 RX ADMIN — HYDROMORPHONE HYDROCHLORIDE 0.5 MG: 1 INJECTION, SOLUTION INTRAMUSCULAR; INTRAVENOUS; SUBCUTANEOUS at 08:38

## 2021-06-02 RX ADMIN — SUGAMMADEX 200 MG: 100 INJECTION, SOLUTION INTRAVENOUS at 09:55

## 2021-06-02 RX ADMIN — PHENYLEPHRINE HYDROCHLORIDE 100 MCG: 10 INJECTION INTRAVENOUS at 09:59

## 2021-06-02 RX ADMIN — FENTANYL CITRATE 100 MCG: 50 INJECTION, SOLUTION INTRAMUSCULAR; INTRAVENOUS at 07:40

## 2021-06-02 RX ADMIN — PHENYLEPHRINE HYDROCHLORIDE 100 MCG: 10 INJECTION INTRAVENOUS at 07:40

## 2021-06-02 RX ADMIN — SODIUM CHLORIDE, POTASSIUM CHLORIDE, SODIUM LACTATE AND CALCIUM CHLORIDE: 600; 310; 30; 20 INJECTION, SOLUTION INTRAVENOUS at 07:29

## 2021-06-02 RX ADMIN — IBUPROFEN 800 MG: 400 TABLET, FILM COATED ORAL at 13:21

## 2021-06-02 RX ADMIN — GLYCOPYRROLATE 0.2 MG: 0.2 INJECTION, SOLUTION INTRAMUSCULAR; INTRAVENOUS at 08:22

## 2021-06-02 RX ADMIN — HEPARIN SODIUM 5000 UNITS: 5000 INJECTION, SOLUTION INTRAVENOUS; SUBCUTANEOUS at 07:24

## 2021-06-02 RX ADMIN — DEXMEDETOMIDINE 40 MCG: 100 INJECTION, SOLUTION, CONCENTRATE INTRAVENOUS at 07:28

## 2021-06-02 RX ADMIN — BUPIVACAINE HYDROCHLORIDE 60 ML: 2.5 INJECTION, SOLUTION EPIDURAL; INFILTRATION; INTRACAUDAL; PERINEURAL at 07:28

## 2021-06-02 RX ADMIN — PHENYLEPHRINE HYDROCHLORIDE 100 MCG: 10 INJECTION INTRAVENOUS at 08:11

## 2021-06-02 RX ADMIN — FENTANYL CITRATE 50 MCG: 50 INJECTION, SOLUTION INTRAMUSCULAR; INTRAVENOUS at 07:17

## 2021-06-02 RX ADMIN — PHENYLEPHRINE HYDROCHLORIDE 100 MCG: 10 INJECTION INTRAVENOUS at 08:19

## 2021-06-02 ASSESSMENT — MIFFLIN-ST. JEOR: SCORE: 1298.03

## 2021-06-02 NOTE — OP NOTE
GYNECOLOGIC ONCOLOGY OPERATION NOTE     PATIENT: Yvrose Andrade  MRN: [id number]  DATE OF SURGERY: June 2, 2021     Pre-operative diagnosis: Endometrioid adenocarcinoma     Post-operative diagnosis: Same    Procedure(s):  Examination under anesthesia, Total laparoscopic hysterectomy, Bilateral salpingo-oophorectomy, Bilateral sentinel lymph node dissection    Surgeon:   Surgeon(s) and Role:     * Saeid Casillas MD - Primary     * Shasha Max MD - Fellow      * Venus Rebolledo MD - Resident - Assisting    Anesthesia:   General endotracheal anesthesia, TAP block     IVF:  800 ml     UOP: 250 ml     EBL: 10 ml     Drains: None; White catheter during the case and discontinued at end of procedure     Complications: None     Specimen(s):  ID Type Source Tests Collected by Time Destination   A : Right Obturator [place]  Lymph Node  Tissue Other SURGICAL PATHOLOGY EXAM Saeid Casillas MD 6/2/2021  8:54 AM    B : Left Obturator [place]  Lymph Node  Tissue Lymph Node, Left Obturator SURGICAL PATHOLOGY EXAM Saeid Casillas MD 6/2/2021  9:06 AM    C : Left external illiac sentinel lymph node Tissue Lymph Node SURGICAL PATHOLOGY EXAM Saeid Casillas MD 6/2/2021  9:09 AM    D : Uterus, Cervix, Bilateral Fallopian Tubes and ovaries Organ Uterus, Cervix and Bilateral Fallopian Tubes SURGICAL PATHOLOGY EXAM Saeid Casillas MD 6/2/2021  9:34 AM        Findings:  On exam under anesthesia, external genitalia with probable lichen sclerosis with hypopigmentation of the vulva and perianal skin with several 2mm punctate hemorrhagic appearing lesions and agglutination of the labia minora. Vagina atrophic. Cervix small without mass/lesion, noted to have cervical stenosis with dilation. Uterus small, mobile, small subserosal fundal fibroid. No adnexal masses or fullness. On laparoscopy, no evidence of injury on abdominal entry. Liver edge with some scarring, no lesions/nodules. Adhesions of cecum to anterior abdominal wall.  "Peritoneum without implants. Uterine instrumentaiton noted to have perforated through the right broad ligament, so pelvic washings not obtained. Normal appearing bilateral tubes and ovaries. ICG mapped bilaterally, on left, obturator and external iliac sentinel lymph nodes removed, on right one obturator sentinel lymph node removed.     Indication:  Yvrose Andrade is a 66 year old with history of CVA, HTN and diabetes with recent Pap smear showing \"endometrial adenocarcinoma\" and endometrial biopsy on 5/17/21 confirming endometrial adenocarcinoma. She was consented for the above procedure. CT pre-operatively showed no evidence of metastatic disease.    Operation/Procedure:  Patient was taken to the operating room. General anesthesia was induced. She was prepped and draped in the usual sterile fashion. Surgical time out was performed. A aldana catheter was placed on the sterile field. After placement of a speculum, the cervix was grasped with a single toothed tenaculum and 0.5mg/mL ICG dye in sterile water was injected into the cervix at the 3 and 9 o'clock positions, both deep and superficial, injecting a total of 4 mL ICG. The uterus was sounded and due to cervical stenosis, unfortunately a uterine perforation occurred.     After changing gloves, attention was turned to the abdomen where a 5mm supra-umbilical incision was made. The Veress needle was inserted into the abdomen and intra-abdominal placement was confirmed with a low insertion pressure of 2 mmHg and saline drop test. The abdomen was insufflated with CO2 to an operating pressure of 15mmHg. A 5mm clear view port was inserted into the abdomen under direct visualization with the laparoscope. A survey of the abdomen revealed the findings noted above. Attention was turned to placement of the accessory ports. Four accessory ports were placed in an arcuate pattern with two on either side. The far right accessory port was an 8 mm port. The remainder were 5 mm " ports. Abdominal survey was performed with findings as above. The patient was placed in steep Trendelenburg and the bowel was swept into the upper abdomen. Pelvic findings as noted above. The uterine manipulator was then placed into the uterus under direct visualization laparoscopically.     Using the Harmonic, the retroperitoneum was opened parallel to the gonadal vessels. The external iliac artery was followed cephalad and the ureter was identified. The para-vesical space was opened to allow visualization of the obturator space. The pelvic sentinel lymph nodes were identified bilaterally using the ICG dye, tracing the afferent lymphatic channels to the sentinel lymph node. The left sentinel lymph node was located along the left external iliac vein and in the obturator space. The right sentinel lymph node was located in the obturator space. These were sent for permanent pathology.    The ureter was again identified. The ovarian vessels were sealed and transected using the Harmonic. The posterior broad ligament was transected toward the uterus to begin skeletonizing the uterine vessels. The round ligament was transected and the anterior broad ligament was transected toward the uterus and the uterovesical peritoneum was incised. The bladder was dissected away from the lower uterus and upper vagina. The uterine vessels were skeletonized and transected. The same procedure was carried out on the contralateral side.    Straight bites were taken on both sides in order to lateralize the uterine vessel pedicle. The monopolar cautery hook was then used to make the colpotomy, which was carried along the cervical cup to free the specimen. The specimen was then removed through the vagina, manipulator was confirmed intact, and the specimen was sent to pathology. The cuff suture was introduced into the abdomen and then a balloon was placed in the vagina to retain pneumoperitoneum.    The vaginal cuff was then closed  laparoscopically using running suture of 0-V Loc. The vaginal balloon was removed and the cuff palpated to ensure complete closure. The cuff was irrigated and found to be hemostatic. All pedicles were inspected and also found to be hemostatic. The pneumoperitoneum was allowed to escape through the ports which were then removed. The port sites were closed using subcuticular sutures and a sterile dressing applied. All counts were correct prior to the conclusion of the case. The aldana was removed. The vagina was inspected and noted to be hemostatic without lacerations. The patient was awakened, extubated, and taken to PACU in stable condition.    Dr. Casillas was present and scrubbed for the entire procedure.     Shasha Max MD    Attending Attestation  I was present for and participated in the entire procedure. I agree with the procedure as documented in the note above, which I have edited as necessary.     Saeid Casillas MD    Gynecologic Oncology

## 2021-06-02 NOTE — ANESTHESIA PROCEDURE NOTES
Airway       Patient location during procedure: OR  Staff -        Anesthesiologist:  Broderick Delatorre MD       Resident/Fellow: Matias Velez MD       Performed By: resident  Consent for Airway        Urgency: elective  Indications and Patient Condition       Indications for airway management: cass-procedural       Induction type:intravenous       Mask difficulty assessment: 1 - vent by mask    Final Airway Details       Final airway type: endotracheal airway       Successful airway: ETT - single  Endotracheal Airway Details        ETT size (mm): 7.0       Cuffed: yes       Successful intubation technique: video laryngoscopy       VL Blade Size: MAC 3       Grade View of Cords: 1       Adjucts: stylet       Position: Right       Measured from: gums/teeth       Secured at (cm): 22       Bite block used: None    Post intubation assessment        Placement verified by: capnometry, equal breath sounds and chest rise        Number of attempts at approach: 1       Number of other approaches attempted: 0       Secured with: pink tape       Ease of procedure: easy       Dentition: Unchanged and Intact    Medication(s) Administered   Medication Administration Time: 6/2/2021 7:48 AM

## 2021-06-02 NOTE — ANESTHESIA PROCEDURE NOTES
TAP Procedure Note  Pre-Procedure   Staff -        Anesthesiologist:  Gaby Cannon MD       Resident/Fellow: Matias Velez MD       Performed By: resident       Location: pre-op       Procedure Start/Stop Times: 6/2/2021 7:17 AM and 6/2/2021 7:25 AM       Pre-Anesthestic Checklist: patient identified, IV checked, site marked, risks and benefits discussed, informed consent, monitors and equipment checked, pre-op evaluation, at physician/surgeon's request and post-op pain management  Timeout:       Correct Patient: Yes        Correct Procedure: Yes        Correct Site: Yes        Correct Position: Yes        Correct Laterality: Yes        Site Marked: Yes  Procedure Documentation  Procedure: TAP       Diagnosis: POST OPERATIVE PAIN       Laterality: bilateral       Patient Position: supine       Patient Prep/Sterile Barriers: sterile gloves, mask       Skin prep: Chloraprep       Needle Type: short bevel       Needle Gauge: 21.        Needle Length (millimeters): 110        Ultrasound guided       1. Ultrasound was used to identify targeted nerve, plexus, vascular marker, or fascial plane and place a needle adjacent to it in real-time.       2. Ultrasound was used to visualize the spread of anesthetic in close proximity to the above referenced structure.       3. A permanent image is entered into the patient's record.    Assessment/Narrative         The placement was negative for: blood aspirated, painful injection and site bleeding       Paresthesias: No.     Bolus given via needle..        Secured via.        Insertion/Infusion Method: Single Shot       Complications: none       Injection made incrementally with aspirations every 5 mL.    Medication(s) Administered   Bupivacaine 0.25% PF (Infiltration), 60 mL  Dexmedetomidine 4 mcg/mL (Perineural), 40 mcg  Dexamethasone 10 mg/mL PF (Perineural), 2 mg  Comments:  Discussed risks of nerve block, including nerve injury, bleeding, infection, incomplete  analgesia.  Informed consent was obtained.   Patient tolerated well. Incremental aspiration every 5 mL. No paresthesia, no heme. Needle tip visualized throughout with appropriate spread of local anesthetic in fascial plane.  Block was placed at the surgeon's request for post operative pain control.

## 2021-06-02 NOTE — DISCHARGE INSTRUCTIONS
[facility], [place] // Same-Day Surgery // Adult Discharge Orders & Instructions     Take it easy when you get home.  Remember, same day surgery DOES NOT MEAN SAME DAY RECOVERY! Healing is a gradual process.   You will need some time to recover- you may be more tired than you realize at first.  Rest and relax for at least the first 24 hours at home.  You'll feel better and heal faster if you take good care of yourself.     ANESTHESIA RECOVERY -   For 24 hours after surgery    1. Get plenty of rest.  A responsible adult must stay with you for at least 24 hours after you leave the hospital.   2. Do not drive or use heavy equipment.  If you have weakness or tingling, don't drive or use heavy equipment until this feeling goes away.  3. Do not drink alcohol.  4. Avoid strenuous or risky activities.  Ask for help when climbing stairs.   5. You may feel lightheaded.  IF so, sit for a few minutes before standing.  Have someone help you get up.   6. If you have nausea (feel sick to your stomach): Drink only clear liquids such as apple juice, ginger ale, broth or 7-Up.  Rest may also help.  Be sure to drink enough fluids.  Move to a regular diet as you feel able.  7. You may have a slight fever. Call the doctor if your fever is over 100 F (37.7 C) (taken under the tongue) or lasts longer than 24 hours.  8. You may have a dry mouth, a sore throat, muscle aches or trouble sleeping.  These should go away after 24 hours.  9. Do not make important or legal decisions.     Call your doctor for any of the followin.  Signs of infection (fever, growing tenderness at the surgery site, a large amount of drainage or bleeding, severe pain, foul-smelling drainage, redness, swelling).  2. It has been over 8 to 10 hours since surgery and you are still not able to urinate (pass water).  3.  Headache for over 24 hours.    To contact a doctor, call:    Dr Casillas's clinic at [phone] and ask  for the resident on call for Gynecology/Oncology (answered 24 hours a day)    Emergency Department: [facility]: [phone] (TTY for hearing impaired: [phone])

## 2021-06-02 NOTE — PROGRESS NOTES
11:25 Received Report from Patricia GAY from PACU. Patient to come to PHASE II RM 4. Patient family are on their way for  since they are 1.5 hr away. Gynecology to see patient once she has urinated and has met Phase II criteria.

## 2021-06-02 NOTE — OR NURSING
Patient assisted to bathroom ambulatory with Louise RODRIGUES, patient was able to void 25 ml of clear, yellow urine, Dr. Casillas paged and notified, @ 1203 Dr. Rebolledo, resident and student into the room to update and evaluate patient, patient on bag #2 of IVF and MD does not want to give more, currently eating ice chips, patient given glass of ice water and Julia Doones per physician and patient request, MD wants to wait another hour for patient to void more.

## 2021-06-02 NOTE — ANESTHESIA CARE TRANSFER NOTE
Patient: Yvrose Andrade    Procedure(s):  Total laparoscopic hysterectomy, bilateral salpingo-oophorectomy, bilateral sentinel lymph node dissection    Diagnosis: Endometrial adenocarcinoma (H) [C54.1]  Diagnosis Additional Information: No value filed.    Anesthesia Type:   General     Note:    Oropharynx: spontaneously breathing  Level of Consciousness: awake  Oxygen Supplementation: face mask  Level of Supplemental Oxygen (L/min / FiO2): 6  Independent Airway: airway patency satisfactory and stable  Dentition: dentition unchanged  Vital Signs Stable: post-procedure vital signs reviewed and stable  Report to RN Given: handoff report given  Patient transferred to: PACU  Comments: Airway :Face Mask  Patient transferred to:PACU  Comments: Prior to extubation, patient was reversed with 200 mg of Sugammadex and shortly afterwards observed with spontaneous breathing with regular pattern and proper tidal volumes. Patient woke up, opened their eyes to verbal stimuli and followed basic commands. Patient was suctioned and extubated without complication.   Transported to PACU on 6L O2 via face mask.   VSS upon arrival to PACU.  Patient denies nausea or pain at this time.   Care transfer plan communicated, appropriate time for questions was given and patient care transferred to PACU RN       Matias Velez MD      Handoff Report: Identifed the Patient, Identified the Reponsible Provider, Reviewed the pertinent medical history, Discussed the surgical course, Reviewed Intra-OP anesthesia mangement and issues during anesthesia, Set expectations for post-procedure period and Allowed opportunity for questions and acknowledgement of understanding      Vitals: (Last set prior to Anesthesia Care Transfer)  CRNA VITALS  6/2/2021 0944 - 6/2/2021 1017      6/2/2021             Pulse:  69    SpO2:  100 %        Electronically Signed By: Matias Velez MD  June 2, 2021  10:17 AM

## 2021-06-02 NOTE — OR NURSING
Discharge instructions reviewed with patient and sister, Vandana sister verbalized understanding and retention of all educations and instructions, all questions and concerns addressed, written instructions sent home.

## 2021-06-02 NOTE — ANESTHESIA POSTPROCEDURE EVALUATION
Patient: Yvrose Andrade    Procedure(s):  Total laparoscopic hysterectomy, bilateral salpingo-oophorectomy, bilateral sentinel lymph node dissection    Diagnosis:Endometrial adenocarcinoma (H) [C54.1]  Diagnosis Additional Information: No value filed.    Anesthesia Type:  General    Note:  Disposition: Admission   Postop Pain Control: Uneventful            Sign Out: Well controlled pain   PONV: No   Neuro/Psych:    Airway/Respiratory: Uneventful            Sign Out: Acceptable/Baseline resp. status   CV/Hemodynamics: Uneventful            Sign Out: Acceptable CV status; No obvious hypovolemia; No obvious fluid overload   Other NRE: NONE   DID A NON-ROUTINE EVENT OCCUR? No           Last vitals:  Vitals:    06/02/21 0725 06/02/21 1016 06/02/21 1030   BP:  109/57 108/66   Pulse: 70 64 66   Resp:  18    Temp:      SpO2: 97% 99% 95%       Last vitals prior to Anesthesia Care Transfer:  CRNA VITALS  6/2/2021 0944 - 6/2/2021 1041      6/2/2021             NIBP:  109/57    Ht Rate:  60    Temp:  36.3  C (97.3  F)    SpO2:  100 %    EKG:  NSR          Electronically Signed By: Broderick Delatorre MD  June 2, 2021  10:41 AM

## 2021-06-02 NOTE — BRIEF OP NOTE
[facility]    Brief Operative Note    Pre-operative diagnosis: Endometrial adenocarcinoma (H) [C54.1]  Post-operative diagnosis Same as pre-operative diagnosis    Procedure: Procedure(s):  Total laparoscopic hysterectomy, bilateral salpingo-oophorectomy, bilateral sentinel lymph node dissection  Surgeon: Surgeon(s) and Role:     * Saeid Casillas MD - Primary     * Venus Rebolledo MD - Resident - Assisting   Shasha Max MD - Fellow  Nelly De La Torre MS4  Anesthesia: Combined General with Block   Estimated blood loss: 10 ml  IVF: pending  UOP: 250 ml  Drains: None  Specimens:   ID Type Source Tests Collected by Time Destination   A : Right Obturator [place]  Lymph Node  Tissue Other SURGICAL PATHOLOGY EXAM Saeid Casillas MD 6/2/2021  8:54 AM    B : Left Obturator [place]  Lymph Node  Tissue Lymph Node, Left Obturator SURGICAL PATHOLOGY EXAM Saeid Casillas MD 6/2/2021  9:06 AM    C : Left external illiac sentinel lymph node Tissue Lymph Node SURGICAL PATHOLOGY EXAM Saeid Casillas MD 6/2/2021  9:09 AM    D : Uterus, Cervix, Bilateral Fallopian Tubes and ovaries Organ Uterus, Cervix and Bilateral Fallopian Tubes SURGICAL PATHOLOGY EXAM Saeid Casillas MD 6/2/2021  9:34 AM      Findings:   On exam under anesthesia, external genitalia with probable lichen sclerosis with hypopigmentation of the vulva and perianal skin with several 2mm punctate hemorrhagic appearing lesions and agglutination of the labia minora. Vagina atrophic. Cervix small. Uterus small, mobile. No adnexal masses or fullness. On laparoscopy, atraumatic entry site. Liver edge with some scarring. Adhesions of cecum to anterior abdominal wall. Uterus small, mobile. Uterine instrumentaiton noted to have perforated through the right broad ligament, so pelvic washingts not obtained. Normal appearing bilateral tubes and ovaries. ICG mapped bilaterally, on left, obturator and external iliac sentinel lymph  nodes removed, on right one obturator sentinel lymph node removed and sent for frozen..  Complications: None.  Implants: * No implants in log *

## 2021-06-02 NOTE — PROGRESS NOTES
Gynecologic Oncology Postoperative Check Note  6/2/2021    S: Patient reports she is doing good postoperatively. Pain is well controlled. Ambulating without pain. Voiding spontaneously. Tolerating crackers and water without nausea or vomiting. Denies chest pain, shortness of breath, dizziness, or other concerns at this time.    O:  Vitals:    06/02/21 1215 06/02/21 1230 06/02/21 1330 06/02/21 1400   BP: 128/74 115/67 105/56 117/59   Pulse: 71 54 50 (!) 45   Resp: 16 16 16 16   Temp:       TempSrc:       SpO2: 92% 92%  95%   Weight:       Height:             Gen: alert and oriented, sitting up in chair in no acute distress  Cardio: rrr, nl s1 and s2, no m/r/g  Resp: lungs CTAB, no wheezes or crackles  Abdomen: soft, appropriately tender to palpation,   Incision: steri strips and bandages in place, no surrounding erythema    Extremities: BLEs non-tender    I/O last 3 completed shifts:  In: 1200 [P.O.:200; I.V.:1000]  Out: 400 [Urine:400]    A: 66 year old POD#0 s/p TLH-BSO, b/l SLND. Doing well postoperatively.    Dz: Endometrial adenocarcinoma w/ p53 expression  FEN: ADAT, tolerating fluids.   Pain: s/p TAP. Tylenol/ibuprofen. PRN oxycodone  Heme: Hgb 14.2>EBL 10mL. VSS, and no concern for ongoing bleeding.  CV: HTN, PTA amlodipine. HLD, PTA rosuvastatin  Pulm: Encourage IS use.   GI: ADAT, prn antiemetics and bowel regimen   : s/p aldana, voided 25 ml  ID: NI. Afebrile   Endo: DM, PTA metformin  Psych/Neuro: H/o stroke, PTA aspirin. Anxiety/depression, PTA celexa  PPX: SCDs  Dispo: Discharge to home POD#0       Nelly De La Torre, MS4    I was present with the medical student who participated in the service and in the documentation of this note.  I have verified the history and personally performed the physical exam and medical decision making, and have verified the content of the note, which accurately reflect my assessment of the patient and the plan of care. POD#0 from TL-BSO, SLND. After evaluating the patient, she  had an additional 125 ml void. Discharged to home.    Venus Rebolledo MD,  Ob/Gyn PGY-4  06/02/21 6:28 PM

## 2021-06-03 ENCOUNTER — PATIENT OUTREACH (OUTPATIENT)
Dept: ONCOLOGY | Facility: CLINIC | Age: 66
End: 2021-06-03

## 2021-06-03 NOTE — PROGRESS NOTES
Post-Discharge Phone Call (completed with patient's sister, Vandana, present)    Surgery Date:  06/02/2021    Description of Surgery:  Examination under anesthesia, Total laparoscopic hysterectomy, Bilateral salpingo-oophorectomy, Bilateral sentinel lymph node dissection.    Pain:  1) Location: Abdomen    2) Rate pain on scale 1-10: 3/10  3) Is your pain well controlled on your pain medication?: Yes  4) How often are you taking your pain medication?: Patient has been alternating Tylenol with ibuprofen as instructed, and shares that she has not need to take any oxycodone so far.      GI:  5) Last bowel movement: Prior to surgery  6) Are you having regular bowel movements?: Not yet, but is passing gas without difficulty.  She has a prescription for Senna-S available at home if needed.  7) Eating/drinking well?: Yes  8) Nausea?: No    Urinary:  9) Are you having problems or difficulty with urination?: Yesterday patient noticed some mild discomfort with urination yesterday (mild burning), but it has resolved today.  She states that she is drinking plenty of water.      Lower Extremities:  10) Were lymph nodes removed during surgery?: Yes  11) If yes, have you been offered a lymphedema consult appointment?: No  12) Any pain, redness, or swelling in legs?: No  13) Any area on your legs that are warm to touch?: No  14) Chest pain or severe shortness of breath?: No    Wound:  15) Type of incision: laparoscopic incisions/port sites   Any of the following:     - Drainage (color, amount): None  - Odor: No  - Redness: No  - Chills: No  - Fever: No  16) Staples - Have you had your staples out yet? (staples should be removed 7-10 days post-op): N/A - no staples present  17) Pt was reminded to wash incision (allow warm, soapy water to run over incision): Yes    Post-op:  18) Verify date and time of appointment: Monday 06/21/2021 at 2:30 pm with Dr. Casillas ([facility])  19) Pt was informed that  pathology will be discussed at this appointment: Yes    Any other questions or concerns at this time?: None    Nasir Barriga RN, BSN, OCN  Oncology Care Coordinator  [facility]

## 2021-06-06 ENCOUNTER — HEALTH MAINTENANCE LETTER (OUTPATIENT)
Age: 66
End: 2021-06-06

## 2021-06-09 LAB — COPATH REPORT: NORMAL

## 2021-06-17 LAB — COPATH REPORT: NORMAL

## 2021-06-21 ENCOUNTER — ONCOLOGY VISIT (OUTPATIENT)
Dept: ONCOLOGY | Facility: CLINIC | Age: 66
End: 2021-06-21
Payer: COMMERCIAL

## 2021-06-21 ENCOUNTER — PATIENT OUTREACH (OUTPATIENT)
Dept: ONCOLOGY | Facility: CLINIC | Age: 66
End: 2021-06-21

## 2021-06-21 VITALS
RESPIRATION RATE: 14 BRPM | HEART RATE: 57 BPM | BODY MASS INDEX: 24.67 KG/M2 | WEIGHT: 157.2 LBS | HEIGHT: 67 IN | DIASTOLIC BLOOD PRESSURE: 72 MMHG | TEMPERATURE: 96.8 F | OXYGEN SATURATION: 97 % | SYSTOLIC BLOOD PRESSURE: 118 MMHG

## 2021-06-21 DIAGNOSIS — C54.1 ENDOMETRIAL ADENOCARCINOMA (H): Primary | ICD-10-CM

## 2021-06-21 DIAGNOSIS — C55 SEROUS ADENOCARCINOMA OF UTERUS (H): Primary | ICD-10-CM

## 2021-06-21 PROCEDURE — 99213 OFFICE O/P EST LOW 20 MIN: CPT | Performed by: OBSTETRICS & GYNECOLOGY

## 2021-06-21 ASSESSMENT — PAIN SCALES - GENERAL: PAINLEVEL: NO PAIN (0)

## 2021-06-21 ASSESSMENT — MIFFLIN-ST. JEOR: SCORE: 1295.68

## 2021-06-21 NOTE — PROGRESS NOTES
"[facility]:  Care Coordination Note    RN met with patient and sister, Vandana, in clinic this afternoon, following her appointment with Dr. Casillas.  Reviewed chemotherapy plan per Dr. Casillas (Taxol/Carboplain x 6 cycles; follow-up with Dr. Casillas 3-4 weeks after cycle #6 chemotherapy with repeat CT scan prior).  Provided overview of chemotherapy regimen, most common side effects, and provided patient with the following handouts to take home:      Printouts from [employer] on specific chemotherapy drugs, printouts on possible chemotherapy side effects/ways to cope with side effects, \"When to Call the Doctor,\" and \"Self-Care Tips During Cancer Treatment,\" and \"Vascular Access Implanted Port Placement.\"  Patient was also provided with information regarding various programs offered at [facility], hair loss resources, handout for the American [employer], as well as important contact information for our cancer clinic including scheduling team, nurse triage line, direct phone number for RN Care Coordinator, and the after-hours Nurse Advice Line.    Patient and sister verbalized understanding.  Patient would prefer to receive her chemotherapy treatments through the [facility], and requests that a referral be sent there.  Advised patient that once the referral has been sent, she will likely need to establish care with the Medical Oncologist at their clinic in order to receive her treatments there.  At this time, patient is unsure if she would like to have a port placed or not.  She will consider this, and will plan to discuss with the Oncologist in [place] at the time of her appointment with them.    Referral order and pertinent clinical documentation was faxed to [facility] this afternoon at [phone].  Encouraged patient to let us know if she does not receive a phone call to schedule by the end of the week.  Writer will continue to follow for further updates on " referral status and appointment scheduling.    Nasir Barriga, RN, BSN, OCN  Oncology Care Coordinator  [facility]

## 2021-06-21 NOTE — NURSING NOTE
"Oncology Rooming Note    June 21, 2021 2:16 PM   Yvrose Andrade is a 66 year old female who presents for:    Chief Complaint   Patient presents with     Oncology Clinic Visit     Follow up     Initial Vitals: /72 (BP Location: Right arm, Patient Position: Sitting, Cuff Size: Adult Regular)   Pulse 57   Temp 96.8  F (36  C) (Oral)   Resp 14   Ht 1.702 m (5' 7\")   Wt 71.3 kg (157 lb 3.2 oz)   SpO2 97%   BMI 24.62 kg/m   Estimated body mass index is 24.62 kg/m  as calculated from the following:    Height as of this encounter: 1.702 m (5' 7\").    Weight as of this encounter: 71.3 kg (157 lb 3.2 oz). Body surface area is 1.84 meters squared.  No Pain (0) Comment: Data Unavailable   No LMP recorded. Patient is postmenopausal.  Allergies reviewed: Yes  Medications reviewed: Yes    Medications: Medication refills not needed today.  Pharmacy name entered into [facility]: [facility] [address] [place] [address]    Clinical concerns: FMLA paperwork to be completed and what are the next plans? Dr. Casillas was notified.      Jadyn Newman [facility]            "

## 2021-06-21 NOTE — LETTER
"    6/21/2021         RE: Evan Andrade  [address] [facility] [address]        Dear Colleague,    Thank you for referring your patient, Evan Andrade, to the [facility]. Please see a copy of my visit note below.    Gynecologic Oncology Clinic - Established Patient Visit    Visit date: Jun 21, 2021     CC: pathology review and treatment planning, uterine cancer    Interval history: Evan Andrade is a 66 year old with PMH notable for stroke who underwent Total laparoscopic hysterectomy, bilateral salpingo-oophorectomy, sentinel lymph node dissection for uterine cancer. She is here today for pathology review and treatment recommendations.    She is overall feeling well. She is eating and drinking well. In general she has no significant diarrhea/constipation or bladder concerns. Her pain is minimal at this point. She doesn't have any incisional concerns. She denies vaginal bleeding.      Review of Systems:  As per HPI, otherwise non-contributory.     Past Surgical History:  Past Surgical History:   Procedure Laterality Date     CORNEAL GRAFTS Bilateral      LAPAROSCOPIC HYSTERECTOMY TOTAL, BILATERAL SALPINGO-OOPHORECTOMY, NODE DISSECTION, COMBINED Bilateral 6/2/2021    Procedure: Total laparoscopic hysterectomy, bilateral salpingo-oophorectomy, bilateral sentinel lymph node dissection;  Surgeon: Saeid Casillas MD;  Location: [facility] OR     TONSILLECTOMY          Physical Exam:  /72 (BP Location: Right arm, Patient Position: Sitting, Cuff Size: Adult Regular)   Pulse 57   Temp 96.8  F (36  C) (Oral)   Resp 14   Ht 1.702 m (5' 7\")   Wt 71.3 kg (157 lb 3.2 oz)   SpO2 97%   BMI 24.62 kg/m     General appearance: no acute distress, well groomed, sitting comfortably   GI: abdomen soft, incisions well healed    Pathology:  Lab Results   Component Value Date    PATH  06/02/2021     Patient Name: EVAN ANDRADE  MR#: [id number]  Specimen #: [id number]  Collected: 6/2/2021 " 09:34  Received: 6/10/2021 10:01  Reported: 6/17/2021 23:21  Ordering Phy(s): ELLY BATEMAN  Additional Phy(s): BULMARO DARNELL    For improved result formatting, select 'View Enhanced Report Format' under   Linked Documents section.  __________________________________________    TEST(S) REQUESTED:  Cytogenetics HER2 FISH    SPECIMEN DESCRIPTION:  Uterus, Cervix, Bilateral Fallopian Tubes    CLINICAL COMMENTS:  [id number]    RESULTS:    Ratio of HER2/HEIDE-17 signals  Yvrose Andrade:  1.2 (NON-amplified)                           Avg.   number HER2 signals/nucleus:  4.4                                                                   Avg.   number HEIDE-17 signals/nucleus:  3.6  Normal Range:  <1.8  Amplified Range: > 2.2  Equivocal Range:  1.8 - 2.2  **Interpretive guidelines per Russell N et al, 2013, Mod Pathol 26:1605 and   Elliot GODDARD et al, 2007, Arch Pathol Lab  Med 131:18.    INTERPRETATION:  According to the interpretive guidelines described in Russell N et al, 2013,   Mod Pathol 26:1605 and Elliot GODDARD et  al, 2007, Arch Pathol Lab Med 131:18, the HER2/HEIDE 17 ratio of 1.2 and   average number of HER2 signals/cell of  4.4 are classified as showing no evidence of HER2 amplification.    Specimen:  Case [id number], Block D4  Reported formalin fixation time:  Unknown  Number of cells scored: 90  Probe:   Dako HER2/HEIDE-17 IQFISH pharmDx Probe Mix to HER2 (17q12) and to   the centromere region of chromosome  17    ADDITIONAL COMMENTS:  The IQFISH pharmDx test has been approved by the FDA for the evaluation of   HER2 (ERBB2) gene amplification  status in formalin-fixed, paraffin-embedded breast cancer tissue specimens   and gastric or gastroesophageal  junction adenocarcinoma.  It is intended for use as an adjunct to other   existing clinicopathologic information  used to evaluate patients with such tumors. This test was developed and   its performance characteristics  determined by the [facility]  [facility]   Clinical Laboratories.    Electronically Signed Out By:  Jagruti Garcia M.D., [facility]    CPT Codes:  A: 34591-RAI9, [id number]    TESTING LAB LOCATION:  [facility]   [facility], [address] [id number] [phone]    COLLECTION SITE:  Client:  [facility]  Location:  [facility] ([facility])         Assessment:  Yvrose is a 66 year old with PMH notable for stroke who underwent staging surgery for endometrial cancer with final pathology returning as a Stage 1A serous uterine cancer.    Plan:  - We reviewed her pathology in depth. We discussed serous histology, which is a more aggressive histology. This unfortunately has a high rate of recurrence, even in early stages. We reviewed that there is limited prospective data for this tumor type; however, the data we do have suggests that chemotherapy likely reduces this recurrence risk.   - As such, I would recommend chemotherapy with carboplatin AUC 5-6 and paclitaxel 175mg/m2 every 21 days for 6 cycles. I am concerned about the potential impact of neuropathy in her given her residual deficits from her stroke. However, I would still recommend starting with paclitaxel. If she develops neuropathy we could switch to alternate taxane such as docetaxel borrowing from the ovarian cancer literature.   - We reviewed the most common and most serious risks/side effects from chemotherapy. The patient was allowed to ask questions, which were answered to my best ability. After discussion of the risks/benefits, she consents to proceed with chemotherapy as recommended.  - After completion of 6 cycles, she should have a CT C/A/P and return visit with me to discuss results and surveillance.    I spent a total of 35 min with Yvrose and her sister, Vandana. 10 minutes was in post-operative care the remaining 25 minutes was in discussion of treatment, recommendations, potential  risks/benefits and side effects.     Saeid Casillas MD     Gynecologic Oncology       Again, thank you for allowing me to participate in the care of your patient.        Sincerely,        Saeid Casillas MD

## 2021-06-21 NOTE — PROGRESS NOTES
[facility]: Form Request Documentation    Date Received in Clinic:  06/21/2021  Mode Received:  Patient Drop Off  Name/Type of Form: FMLA form for continuous leave   Date Completed: 06/21/2021  Date patient notified: 06/21/2021  Disposition of Form:  Completed form faxed to [facility] at fax number [phone].  Copy of form labeled for scanning.  Notes: Patient has requested a leave of absence from 05/31/2021-08/02/2021.    Nasir Barriga, RN, BSN, OCN  Oncology Care Coordinator  [facility]

## 2021-06-22 NOTE — PROGRESS NOTES
[facility]:  Care Coordination Note    Received voicemail message from Kayleigh Ramires RNCC with the [facility], calling to confirm they received patient's referral and records.  Per Kayleigh, patient will be contacted either later today or tomorrow to be offered a consult appointment with Dr. Oliva - their first opening is Tuesday next week 6/29/21.    Writer will continue to follow for further updates.     Nasir Barriga RN, BSN, OCN  Oncology Care Coordinator  [facility]

## 2021-06-22 NOTE — PROGRESS NOTES
"Gynecologic Oncology Clinic - Established Patient Visit    Visit date: Jun 21, 2021     CC: pathology review and treatment planning, uterine cancer    Interval history: Yvrose Andrade is a 66 year old with PMH notable for stroke who underwent Total laparoscopic hysterectomy, bilateral salpingo-oophorectomy, sentinel lymph node dissection for uterine cancer. She is here today for pathology review and treatment recommendations.    She is overall feeling well. She is eating and drinking well. In general she has no significant diarrhea/constipation or bladder concerns. Her pain is minimal at this point. She doesn't have any incisional concerns. She denies vaginal bleeding.      Review of Systems:  As per HPI, otherwise non-contributory.     Past Surgical History:  Past Surgical History:   Procedure Laterality Date     CORNEAL GRAFTS Bilateral      LAPAROSCOPIC HYSTERECTOMY TOTAL, BILATERAL SALPINGO-OOPHORECTOMY, NODE DISSECTION, COMBINED Bilateral 6/2/2021    Procedure: Total laparoscopic hysterectomy, bilateral salpingo-oophorectomy, bilateral sentinel lymph node dissection;  Surgeon: Saeid Casillas MD;  Location: [facility] OR     TONSILLECTOMY          Physical Exam:  /72 (BP Location: Right arm, Patient Position: Sitting, Cuff Size: Adult Regular)   Pulse 57   Temp 96.8  F (36  C) (Oral)   Resp 14   Ht 1.702 m (5' 7\")   Wt 71.3 kg (157 lb 3.2 oz)   SpO2 97%   BMI 24.62 kg/m     General appearance: no acute distress, well groomed, sitting comfortably   GI: abdomen soft, incisions well healed    Pathology:  Lab Results   Component Value Date    PATH  06/02/2021     Patient Name: YVROSE ANDRADE  MR#: [id number]  Specimen #: [id number]  Collected: 6/2/2021 09:34  Received: 6/10/2021 10:01  Reported: 6/17/2021 23:21  Ordering Phy(s): ELLY BATEMAN  Additional Phy(s): SAEID CASILLAS    For improved result formatting, select 'View Enhanced Report Format' under   Linked Documents " section.  __________________________________________    TEST(S) REQUESTED:  Cytogenetics HER2 FISH    SPECIMEN DESCRIPTION:  Uterus, Cervix, Bilateral Fallopian Tubes    CLINICAL COMMENTS:  [id number]    RESULTS:    Ratio of HER2/HEIDE-17 signals  Yvrose Andrade:  1.2 (NON-amplified)                           Avg.   number HER2 signals/nucleus:  4.4                                                                   Avg.   number HEIDE-17 signals/nucleus:  3.6  Normal Range:  <1.8  Amplified Range: > 2.2  Equivocal Range:  1.8 - 2.2  **Interpretive guidelines per Russell LOPEZ et al, 2013, Mod Pathol 26:1605 and   Elliot Teresa al, 2007, Arch Pathol Lab  Med 131:18.    INTERPRETATION:  According to the interpretive guidelines described in Russell LOPEZ et al, 2013,   Mod Pathol 26:1605 and Elliot GODDARD et  al, 2007, Arch Pathol Lab Med 131:18, the HER2/HEIDE 17 ratio of 1.2 and   average number of HER2 signals/cell of  4.4 are classified as showing no evidence of HER2 amplification.    Specimen:  Case [id number], Block D4  Reported formalin fixation time:  Unknown  Number of cells scored: 90  Probe:   Dako HER2/HEIDE-17 IQFISH pharmDx Probe Mix to HER2 (17q12) and to   the centromere region of chromosome  17    ADDITIONAL COMMENTS:  The IQFISH pharmDx test has been approved by the FDA for the evaluation of   HER2 (ERBB2) gene amplification  status in formalin-fixed, paraffin-embedded breast cancer tissue specimens   and gastric or gastroesophageal  junction adenocarcinoma.  It is intended for use as an adjunct to other   existing clinicopathologic information  used to evaluate patients with such tumors. This test was developed and   its performance characteristics  determined by the [facility] [place]   Clinical Laboratories.    Electronically Signed Out By:  Jagruti Garcia M.D., [facility]    CPT Codes:  A: 07147-RIQ0, [id number]    TESTING LAB LOCATION:  [facility]   [facility]  [address] [place] [id number] [phone]    COLLECTION SITE:  Client:  [facility] [place]  Location:  [facility] ([facility])         Assessment:  Yvrose is a 66 year old with PMH notable for stroke who underwent staging surgery for endometrial cancer with final pathology returning as a Stage 1A serous uterine cancer.    Plan:  - We reviewed her pathology in depth. We discussed serous histology, which is a more aggressive histology. This unfortunately has a high rate of recurrence, even in early stages. We reviewed that there is limited prospective data for this tumor type; however, the data we do have suggests that chemotherapy likely reduces this recurrence risk.   - As such, I would recommend chemotherapy with carboplatin AUC 5-6 and paclitaxel 175mg/m2 every 21 days for 6 cycles. I am concerned about the potential impact of neuropathy in her given her residual deficits from her stroke. However, I would still recommend starting with paclitaxel. If she develops neuropathy we could switch to alternate taxane such as docetaxel borrowing from the ovarian cancer literature.   - We reviewed the most common and most serious risks/side effects from chemotherapy. The patient was allowed to ask questions, which were answered to my best ability. After discussion of the risks/benefits, she consents to proceed with chemotherapy as recommended.  - After completion of 6 cycles, she should have a CT C/A/P and return visit with me to discuss results and surveillance.    I spent a total of 35 min with Yvrose and her sister, Vandana. 10 minutes was in post-operative care the remaining 25 minutes was in discussion of treatment, recommendations, potential risks/benefits and side effects.     Saeid Csaillas MD     Gynecologic Oncology

## 2021-08-31 ENCOUNTER — PATIENT OUTREACH (OUTPATIENT)
Dept: ONCOLOGY | Facility: CLINIC | Age: 66
End: 2021-08-31

## 2021-08-31 NOTE — PROGRESS NOTES
[facility]: Form Request Documentation    Date Received in Clinic:  08/20/2021  Mode Received:  Fax  Name/Type of Form: Return to Work Form  Date Completed: 08/31/2021  Disposition of Form:  Completed form faxed to [employer] at fax number [phone].  Form labeled and sent to HIM for scanning.  Notes: Per patient, she is not planning to return to work throughout the duration of her chemotherapy treatments.  Tentative return to work is planned for ~December 2021 but is subject to change.    Nasir Barriga, RN, BSN, OCN  Oncology Care Coordinator  [facility]

## 2021-09-26 ENCOUNTER — HEALTH MAINTENANCE LETTER (OUTPATIENT)
Age: 66
End: 2021-09-26

## 2021-11-09 ENCOUNTER — TELEPHONE (OUTPATIENT)
Dept: ONCOLOGY | Facility: CLINIC | Age: 66
End: 2021-11-09
Payer: COMMERCIAL

## 2021-11-09 NOTE — TELEPHONE ENCOUNTER
----- Message from Nasir Barriga RN sent at 11/8/2021  9:57 AM CST -----  Regarding: F/u appt with Dr. Sonja Piña,    Dr. Casillas wanted this patient to see her after her 6th chemo cycle (patient has been getting chemo in [place]).  Appears patient's 6th chemo was on 11/2.    Since patient will be having a CT in [place] on 11/22 (and Dr. Casillas would want these results before seeing her), please set up the patient to see Dr. Casillas 1-2 weeks after that.  Looks like she has an opening here in  on 12/6.    Patient's sister, Vandana, is the best contact for appointment scheduling (patient has memory issues).  Vandana can be reached at [phone].    Thank you,Nasir

## 2021-11-22 ENCOUNTER — TRANSFERRED RECORDS (OUTPATIENT)
Dept: HEALTH INFORMATION MANAGEMENT | Facility: CLINIC | Age: 66
End: 2021-11-22
Payer: COMMERCIAL

## 2021-11-24 ENCOUNTER — PATIENT OUTREACH (OUTPATIENT)
Dept: ONCOLOGY | Facility: CLINIC | Age: 66
End: 2021-11-24
Payer: COMMERCIAL

## 2021-11-24 NOTE — PROGRESS NOTES
[facility]:  Care Coordination Note    Received patient's CT results from 11/22/2021 via [facility] Everywhere ([facility]).  Copy of CT results report saved for patient's appointment with Dr. Casillas on 12/6.  CT images were requested from [facility] film desk this morning.     Nasir Barriga RN, BSN, OCN  Oncology Care Coordinator  [facility]

## 2021-11-30 ENCOUNTER — PATIENT OUTREACH (OUTPATIENT)
Dept: ONCOLOGY | Facility: CLINIC | Age: 66
End: 2021-11-30
Payer: COMMERCIAL

## 2021-11-30 NOTE — PROGRESS NOTES
[facility]:  Care Coordination Note    Received voicemail message from patient's sister, Vandana Ceja, this morning, calling regarding patient's upcoming appointment with Dr. Casillas on 12/6/21.  Sister asked if there happened to be any appointment openings earlier in the day.  She also wanted to make sure that we have received patient's CT results and notes from her local Oncologist (Dr. Oliva, [facility]).    Telephone call was returned to sister, to let her know that we have already received patient's recent CT results and notes from Dr. Oliva, and also to let her know that unfortunately as of right now Dr. Casillas does not have any sooner openings for appointments on 12/6.  Sister did not answer, so a detailed voicemail message was left with this information.  Advised sister that should something open up earlier in the day, we can definitely let her know.    Encouraged sister to return call with any further questions or concerns.     Nasir Barriga, RN, BSN, OCN  Oncology Care Coordinator  [facility]

## 2021-12-06 ENCOUNTER — ONCOLOGY VISIT (OUTPATIENT)
Dept: ONCOLOGY | Facility: CLINIC | Age: 66
End: 2021-12-06
Payer: COMMERCIAL

## 2021-12-06 VITALS
SYSTOLIC BLOOD PRESSURE: 161 MMHG | HEIGHT: 67 IN | DIASTOLIC BLOOD PRESSURE: 79 MMHG | WEIGHT: 166 LBS | HEART RATE: 74 BPM | BODY MASS INDEX: 26.06 KG/M2 | OXYGEN SATURATION: 99 %

## 2021-12-06 DIAGNOSIS — C55 SEROUS ADENOCARCINOMA OF UTERUS (H): Primary | ICD-10-CM

## 2021-12-06 DIAGNOSIS — T45.1X5A ANEMIA DUE TO CHEMOTHERAPY: ICD-10-CM

## 2021-12-06 DIAGNOSIS — D64.81 ANEMIA DUE TO CHEMOTHERAPY: ICD-10-CM

## 2021-12-06 PROCEDURE — 99214 OFFICE O/P EST MOD 30 MIN: CPT | Performed by: OBSTETRICS & GYNECOLOGY

## 2021-12-06 ASSESSMENT — MIFFLIN-ST. JEOR: SCORE: 1335.6

## 2021-12-06 ASSESSMENT — PAIN SCALES - GENERAL: PAINLEVEL: NO PAIN (0)

## 2021-12-06 NOTE — PROGRESS NOTES
Gynecologic Oncology Clinic - Established Patient Visit    Visit date: Dec 6, 2021     CC: pathology review and treatment planning, uterine cancer    Interval history: Yvrose Andrade is a 66 year old with PMH notable for stroke with Stage 1A serous uterine cancer now s/p adjuvant chemotherapy locally.    She returns with her sister for post-treatment visit. She is overall feeling well. Still with some fatigue and dysguesia. No abdominal or pelvic pain. No significant neuropathy. Appetite is improving. No vaginal bleeding. No change in bladder or bowel habits.    Oncology History  1) Presented with post-menopausal bleeding 4/2021.  2) Pelvic US on 5/5/21 w thickened heterogenous endometrial stripe, 1.3 to 1.5 cm w ?myometrial invasion.  3) Endometrial Bx on 5/17/21 w endometrial adenoCa, p53 over expression (>90%), consistent with serous  4) CT c/a/p on 6/1/21 w no pelvic / RP adenopathy or distant metastatic disease.  5) Lap hysterectomy, BSO, pelvic sentinel node dissection on 6/2/21 ().  6) Final path w 3.2 cm, unifocal serous Ca inv the lower uterine seg, 6 mm invasion into an 18 mm thick myometrium, no LVsI, no cervical stromal inv, benign ovaries and Fallopian tubes, 1/1 Lt obturator, 1/1 Rt obturator and 1/1 Lt ext iliac LN neg for tumor. HER 2 neg by FISH.  7) Adjuvant carboplatin + Taxol x 6 cycles b/w 7/20/21 - 11/2/21.  8) CT c/a/p on 11/22/21 w no evidence of recurrence.  8.3 on 11/22/21.  9) Radiation Oncology consult for vaginal cuff brachytherapy - pending    Review of Systems:  As per HPI, otherwise non-contributory.     Past Surgical History:  Past Surgical History:   Procedure Laterality Date     CORNEAL GRAFTS Bilateral      LAPAROSCOPIC HYSTERECTOMY TOTAL, BILATERAL SALPINGO-OOPHORECTOMY, NODE DISSECTION, COMBINED Bilateral 6/2/2021    Procedure: Total laparoscopic hysterectomy, bilateral salpingo-oophorectomy, bilateral sentinel lymph node dissection;  Surgeon: Saeid Casillas,  "MD;  Location: [facility] OR     TONSILLECTOMY          Physical Exam:  BP (!) 161/79 (BP Location: Right arm, Patient Position: Chair, Cuff Size: Adult Regular)   Pulse 74   Ht 1.702 m (5' 7\")   Wt 75.3 kg (166 lb)   SpO2 99%   BMI 26.00 kg/m     General appearance: no acute distress, well groomed, sitting comfortably       Pathology:  N/a        Assessment:  Yvrose is a 66 year old with PMH notable for stroke with Stage 1A uterine papillary serous carcinoma s/p adjuvant chemotherapy here to discuss follow-up.    Plan:  - We reviewed the potential benefits of vaginal brachytherapy in terms of decrease in risk of recurrence. We discussed the risks/benefits. It is worth meeting with radiation oncology to discuss it as an option.   - For surveillance, I would recommend every 3 month follow-up for the first 2 years (11/2023) and then every 6 month follow-up until 5 years (11/2026). We can adjust as needed. We discussed options for every other in person follow-up with the alternate by phone or follow-up with a gynecologist closer to home and every other visit with me. For now she would like to do in person follow-up at our clinic.   - Return in 3 months for pelvic exam.  - Chemotherapy related anemia: I have reviewed most recent labs. Repeat labs prior to next visit to ensure improvement.     I spent a total of 30min on the care of Yvrose Andrade on the day of service.    Saeid Casillas MD     Gynecologic Oncology   "

## 2021-12-06 NOTE — NURSING NOTE
"Oncology Rooming Note    December 6, 2021 3:19 PM   Yvrose Andrade is a 66 year old female who presents for:    Chief Complaint   Patient presents with     Oncology Clinic Visit     follow up     Initial Vitals: BP (!) 161/79 (BP Location: Right arm, Patient Position: Chair, Cuff Size: Adult Regular)   Pulse 74   Ht 1.702 m (5' 7\")   Wt 75.3 kg (166 lb)   SpO2 99%   BMI 26.00 kg/m   Estimated body mass index is 26 kg/m  as calculated from the following:    Height as of this encounter: 1.702 m (5' 7\").    Weight as of this encounter: 75.3 kg (166 lb). Body surface area is 1.89 meters squared.  No Pain (0) Comment: Data Unavailable   No LMP recorded. Patient is postmenopausal.  Allergies reviewed: Yes  Medications reviewed: Yes    Medications: Medication refills not needed today.  Pharmacy name entered into [employer]: [facility] [address] [place] [address]    Clinical concerns: NO Dr. Casillas was notified.      Dary Case CMA              "

## 2021-12-06 NOTE — LETTER
12/6/2021         RE: Yvrose Andrade  [address] [facility] [address]        Dear Colleague,    Thank you for referring your patient, Yvrose Andrade, to the [facility]. Please see a copy of my visit note below.    Gynecologic Oncology Clinic - Established Patient Visit    Visit date: Dec 6, 2021     CC: pathology review and treatment planning, uterine cancer    Interval history: Yvrose Andrade is a 66 year old with PMH notable for stroke with Stage 1A serous uterine cancer now s/p adjuvant chemotherapy locally.    She returns with her sister for post-treatment visit. She is overall feeling well. Still with some fatigue and dysguesia. No abdominal or pelvic pain. No significant neuropathy. Appetite is improving. No vaginal bleeding. No change in bladder or bowel habits.    Oncology History  1) Presented with post-menopausal bleeding 4/2021.  2) Pelvic US on 5/5/21 w thickened heterogenous endometrial stripe, 1.3 to 1.5 cm w ?myometrial invasion.  3) Endometrial Bx on 5/17/21 w endometrial adenoCa, p53 over expression (>90%), consistent with serous  4) CT c/a/p on 6/1/21 w no pelvic / RP adenopathy or distant metastatic disease.  5) Lap hysterectomy, BSO, pelvic sentinel node dissection on 6/2/21 ().  6) Final path w 3.2 cm, unifocal serous Ca inv the lower uterine seg, 6 mm invasion into an 18 mm thick myometrium, no LVsI, no cervical stromal inv, benign ovaries and Fallopian tubes, 1/1 Lt obturator, 1/1 Rt obturator and 1/1 Lt ext iliac LN neg for tumor. HER 2 neg by FISH.  7) Adjuvant carboplatin + Taxol x 6 cycles b/w 7/20/21 - 11/2/21.  8) CT c/a/p on 11/22/21 w no evidence of recurrence.  8.3 on 11/22/21.  9) Radiation Oncology consult for vaginal cuff brachytherapy - pending    Review of Systems:  As per HPI, otherwise non-contributory.     Past Surgical History:  Past Surgical History:   Procedure Laterality Date     CORNEAL GRAFTS Bilateral       "LAPAROSCOPIC HYSTERECTOMY TOTAL, BILATERAL SALPINGO-OOPHORECTOMY, NODE DISSECTION, COMBINED Bilateral 6/2/2021    Procedure: Total laparoscopic hysterectomy, bilateral salpingo-oophorectomy, bilateral sentinel lymph node dissection;  Surgeon: Saeid Casillas MD;  Location: [facility] OR     TONSILLECTOMY          Physical Exam:  BP (!) 161/79 (BP Location: Right arm, Patient Position: Chair, Cuff Size: Adult Regular)   Pulse 74   Ht 1.702 m (5' 7\")   Wt 75.3 kg (166 lb)   SpO2 99%   BMI 26.00 kg/m     General appearance: no acute distress, well groomed, sitting comfortably       Pathology:  N/a        Assessment:  Yvrose is a 66 year old with PMH notable for stroke with Stage 1A uterine papillary serous carcinoma s/p adjuvant chemotherapy here to discuss follow-up.    Plan:  - We reviewed the potential benefits of vaginal brachytherapy in terms of decrease in risk of recurrence. We discussed the risks/benefits. It is worth meeting with radiation oncology to discuss it as an option.   - For surveillance, I would recommend every 3 month follow-up for the first 2 years (11/2023) and then every 6 month follow-up until 5 years (11/2026). We can adjust as needed. We discussed options for every other in person follow-up with the alternate by phone or follow-up with a gynecologist closer to home and every other visit with me. For now she would like to do in person follow-up at our clinic.   - Return in 3 months for pelvic exam.  - Chemotherapy related anemia: I have reviewed most recent labs. Repeat labs prior to next visit to ensure improvement.     I spent a total of 30min on the care of Yvrose Andrade on the day of service.    Saeid Casillas MD     Gynecologic Oncology       Again, thank you for allowing me to participate in the care of your patient.        Sincerely,        Saeid Casillas MD    "

## 2021-12-08 ENCOUNTER — TRANSFERRED RECORDS (OUTPATIENT)
Dept: HEALTH INFORMATION MANAGEMENT | Facility: CLINIC | Age: 66
End: 2021-12-08
Payer: COMMERCIAL

## 2021-12-29 ENCOUNTER — PATIENT OUTREACH (OUTPATIENT)
Dept: ONCOLOGY | Facility: CLINIC | Age: 66
End: 2021-12-29
Payer: COMMERCIAL

## 2021-12-29 NOTE — PROGRESS NOTES
[facility]:  Care Coordination Note    Received voicemail message from patient this afternoon, asking if we have received some paperwork from [employer], regarding an extension on her short-term disability/leave of absence.  Writer returned telephone call to patient letting her know that so far, we have not received anything from [employer].  Patient states she will check with them to make sure they have the correct contact information and fax number for our clinic (this was provided to patient on the phone today).  Of note, patient is requesting to extend her leave through 03/01/2022.  Advised patient we will let her know once we receive the form and it has been completed.     Nasir Barriga, RN, BSN, OCN  Oncology Care Coordinator  [facility]

## 2022-01-16 ENCOUNTER — HEALTH MAINTENANCE LETTER (OUTPATIENT)
Age: 67
End: 2022-01-16

## 2022-03-13 ENCOUNTER — HEALTH MAINTENANCE LETTER (OUTPATIENT)
Age: 67
End: 2022-03-13

## 2022-05-08 ENCOUNTER — HEALTH MAINTENANCE LETTER (OUTPATIENT)
Age: 67
End: 2022-05-08

## 2022-05-16 ENCOUNTER — ONCOLOGY VISIT (OUTPATIENT)
Dept: ONCOLOGY | Facility: CLINIC | Age: 67
End: 2022-05-16
Payer: COMMERCIAL

## 2022-05-16 ENCOUNTER — LAB (OUTPATIENT)
Dept: ONCOLOGY | Facility: CLINIC | Age: 67
End: 2022-05-16
Payer: COMMERCIAL

## 2022-05-16 VITALS
WEIGHT: 162.2 LBS | HEART RATE: 76 BPM | BODY MASS INDEX: 25.46 KG/M2 | DIASTOLIC BLOOD PRESSURE: 74 MMHG | HEIGHT: 67 IN | OXYGEN SATURATION: 97 % | SYSTOLIC BLOOD PRESSURE: 145 MMHG | TEMPERATURE: 98.4 F

## 2022-05-16 DIAGNOSIS — C55 SEROUS ADENOCARCINOMA OF UTERUS (H): Primary | ICD-10-CM

## 2022-05-16 DIAGNOSIS — C55 SEROUS ADENOCARCINOMA OF UTERUS (H): ICD-10-CM

## 2022-05-16 DIAGNOSIS — N90.4 LICHEN SCLEROSUS ET ATROPHICUS OF THE VULVA: ICD-10-CM

## 2022-05-16 LAB
BASOPHILS # BLD AUTO: 0 10E3/UL (ref 0–0.2)
BASOPHILS NFR BLD AUTO: 1 %
EOSINOPHIL # BLD AUTO: 0.4 10E3/UL (ref 0–0.7)
EOSINOPHIL NFR BLD AUTO: 6 %
ERYTHROCYTE [DISTWIDTH] IN BLOOD BY AUTOMATED COUNT: 12.9 % (ref 10–15)
HCT VFR BLD AUTO: 31.6 % (ref 35–47)
HGB BLD-MCNC: 10.3 G/DL (ref 11.7–15.7)
IMM GRANULOCYTES # BLD: 0 10E3/UL
IMM GRANULOCYTES NFR BLD: 0 %
LYMPHOCYTES # BLD AUTO: 2.3 10E3/UL (ref 0.8–5.3)
LYMPHOCYTES NFR BLD AUTO: 34 %
MCH RBC QN AUTO: 30.7 PG (ref 26.5–33)
MCHC RBC AUTO-ENTMCNC: 32.6 G/DL (ref 31.5–36.5)
MCV RBC AUTO: 94 FL (ref 78–100)
MONOCYTES # BLD AUTO: 0.5 10E3/UL (ref 0–1.3)
MONOCYTES NFR BLD AUTO: 7 %
NEUTROPHILS # BLD AUTO: 3.5 10E3/UL (ref 1.6–8.3)
NEUTROPHILS NFR BLD AUTO: 52 %
NRBC # BLD AUTO: 0 10E3/UL
NRBC BLD AUTO-RTO: 0 /100
PLATELET # BLD AUTO: 201 10E3/UL (ref 150–450)
RBC # BLD AUTO: 3.36 10E6/UL (ref 3.8–5.2)
WBC # BLD AUTO: 6.8 10E3/UL (ref 4–11)

## 2022-05-16 PROCEDURE — 85025 COMPLETE CBC W/AUTO DIFF WBC: CPT | Performed by: NURSE PRACTITIONER

## 2022-05-16 PROCEDURE — 99214 OFFICE O/P EST MOD 30 MIN: CPT | Performed by: OBSTETRICS & GYNECOLOGY

## 2022-05-16 PROCEDURE — 99207 PR NO CHARGE NURSE ONLY: CPT

## 2022-05-16 RX ORDER — CLOBETASOL PROPIONATE 0.5 MG/G
OINTMENT TOPICAL
Qty: 45 G | Refills: 3 | Status: SHIPPED | OUTPATIENT
Start: 2022-05-16

## 2022-05-16 RX ORDER — HEPARIN SODIUM (PORCINE) LOCK FLUSH IV SOLN 100 UNIT/ML 100 UNIT/ML
500 SOLUTION INTRAVENOUS ONCE
Status: COMPLETED | OUTPATIENT
Start: 2022-05-16 | End: 2022-05-16

## 2022-05-16 RX ORDER — LISINOPRIL 10 MG/1
10 TABLET ORAL
COMMUNITY
Start: 2022-01-28

## 2022-05-16 RX ADMIN — HEPARIN SODIUM (PORCINE) LOCK FLUSH IV SOLN 100 UNIT/ML 500 UNITS: 100 SOLUTION at 10:29

## 2022-05-16 ASSESSMENT — PAIN SCALES - GENERAL: PAINLEVEL: NO PAIN (0)

## 2022-05-16 NOTE — PROGRESS NOTES
Gynecologic Oncology Clinic - Established Patient Visit    Visit date: May 16, 2022     CC: uterine serous carcinoma follow-up    Interval history: Yvrose Andrade is a 67 year old with PMH notable for stroke with history of Stage 1A serous uterine cancer s/p adjuvant chemotherapy and vaginal brachytherapy.    She is overall feeling well. No vaginal bleeding or discharge. No pelvic or abdominal pain. No change in health history. No specific concerns. Brings her dilators today, unsure what to do with these has not been using them, but is sexually active with vaginal intercourse 2x/week.    Oncology History  1) Presented with post-menopausal bleeding 4/2021.  2) Pelvic US on 5/5/21 w thickened heterogenous endometrial stripe, 1.3 to 1.5 cm w ?myometrial invasion.  3) Endometrial Bx on 5/17/21 w endometrial adenoCa, p53 over expression (>90%), consistent with serous  4) CT c/a/p on 6/1/21 w no pelvic / RP adenopathy or distant metastatic disease.  5) Lap hysterectomy, BSO, pelvic sentinel node dissection on 6/2/21 ().  6) Final path w 3.2 cm, unifocal serous Ca inv the lower uterine seg, 6 mm invasion into an 18 mm thick myometrium, no LVsI, no cervical stromal inv, benign ovaries and Fallopian tubes, 1/1 Lt obturator, 1/1 Rt obturator and 1/1 Lt ext iliac LN neg for tumor. HER 2 neg by FISH.  7) Adjuvant carboplatin + Taxol x 6 cycles b/w 7/20/21 - 11/2/21.  8) CT c/a/p on 11/22/21 w no evidence of recurrence.  8.3 on 11/22/21.  9) Radiation Oncology consult for vaginal cuff brachytherapy - 1/4/2022-1/14/2022 [facility] to the cuff.    Review of Systems:  As per HPI, otherwise non-contributory.     Past Surgical History:  Past Surgical History:   Procedure Laterality Date     CORNEAL GRAFTS Bilateral      LAPAROSCOPIC HYSTERECTOMY TOTAL, BILATERAL SALPINGO-OOPHORECTOMY, NODE DISSECTION, COMBINED Bilateral 6/2/2021    Procedure: Total laparoscopic hysterectomy, bilateral salpingo-oophorectomy, bilateral  "sentinel lymph node dissection;  Surgeon: Saeid Casillas MD;  Location: [facility] OR     TONSILLECTOMY          Physical Exam:  BP (!) 145/74 (BP Location: Right arm)   Pulse 76   Temp 98.4  F (36.9  C) (Oral)   Ht 1.702 m (5' 7\")   Wt 73.6 kg (162 lb 3.2 oz)   SpO2 97%   BMI 25.40 kg/m     General appearance: no acute distress, well groomed, sitting comfortably   Abd: soft, non-tender, incisions without masses  : normal external genitalia, slight pallor of upper vagina, no stenosis, no lesions, no nodularity, bimanual exam with no masses      Pathology/labs:  N/a        Assessment:  Yvrose is a 67 year old with PMH notable for stroke with Stage 1A uterine papillary serous carcinoma s/p adjuvant chemotherapy and vaginal brachytherapy here for surveillance visit and exam. No concerns for recurrence.    Plan:  - Surveillance every 3 month for the first 2 years (11/2023) and then every 6 month follow-up until 5 years (11/2026). Per chart review, she is seeing her local medical oncologist every 3 months. Thus I think it most prudent for me to see her once a year for pelvic exams in lieu of one of those visits. If she were to develop vaginal bleeding or discharge such that there are concerns between visits, I am happy to see her in our clinic at any time.     - Vaginal dilators: given regular vaginal intercourse, no need to vaginal dilator use.    I spent a total of 30min on the care of Yvrose Andrade on the day of service.    Saeid Casillas MD     Gynecologic Oncology   "

## 2022-05-16 NOTE — NURSING NOTE
"Oncology Rooming Note    May 16, 2022 10:44 AM   Yvrose Andrade is a 67 year old female who presents for:    Chief Complaint   Patient presents with     Oncology Clinic Visit     Initial Vitals: BP (!) 145/74 (BP Location: Right arm)   Pulse 76   Temp 98.4  F (36.9  C) (Oral)   Ht 1.702 m (5' 7\")   Wt 73.6 kg (162 lb 3.2 oz)   SpO2 97%   BMI 25.40 kg/m   Estimated body mass index is 25.4 kg/m  as calculated from the following:    Height as of this encounter: 1.702 m (5' 7\").    Weight as of this encounter: 73.6 kg (162 lb 3.2 oz). Body surface area is 1.87 meters squared.  No Pain (0) Comment: Data Unavailable   No LMP recorded. Patient is postmenopausal.  Allergies reviewed: Yes  Medications reviewed: Yes    Medications: Medication refills not needed today.  Pharmacy name entered into [employer] [facility]: [facility] [address] [place] [address]          Jovana Zepeda LPN              "

## 2022-05-16 NOTE — LETTER
5/16/2022         RE: Yvrose Andrade  [address] [facility] [address]        Dear Colleague,    Thank you for referring your patient, Yvrose Andrade, to the [facility]. Please see a copy of my visit note below.    Gynecologic Oncology Clinic - Established Patient Visit    Visit date: May 16, 2022     CC: uterine serous carcinoma follow-up    Interval history: Yvrose Andrade is a 67 year old with PMH notable for stroke with history of Stage 1A serous uterine cancer s/p adjuvant chemotherapy and vaginal brachytherapy.    She is overall feeling well. No vaginal bleeding or discharge. No pelvic or abdominal pain. No change in health history. No specific concerns. Brings her dilators today, unsure what to do with these has not been using them, but is sexually active with vaginal intercourse 2x/week.    Oncology History  1) Presented with post-menopausal bleeding 4/2021.  2) Pelvic US on 5/5/21 w thickened heterogenous endometrial stripe, 1.3 to 1.5 cm w ?myometrial invasion.  3) Endometrial Bx on 5/17/21 w endometrial adenoCa, p53 over expression (>90%), consistent with serous  4) CT c/a/p on 6/1/21 w no pelvic / RP adenopathy or distant metastatic disease.  5) Lap hysterectomy, BSO, pelvic sentinel node dissection on 6/2/21 ().  6) Final path w 3.2 cm, unifocal serous Ca inv the lower uterine seg, 6 mm invasion into an 18 mm thick myometrium, no LVsI, no cervical stromal inv, benign ovaries and Fallopian tubes, 1/1 Lt obturator, 1/1 Rt obturator and 1/1 Lt ext iliac LN neg for tumor. HER 2 neg by FISH.  7) Adjuvant carboplatin + Taxol x 6 cycles b/w 7/20/21 - 11/2/21.  8) CT c/a/p on 11/22/21 w no evidence of recurrence.  8.3 on 11/22/21.  9) Radiation Oncology consult for vaginal cuff brachytherapy - 1/4/2022-1/14/2022 [facility] to the cuff.    Review of Systems:  As per HPI, otherwise non-contributory.     Past Surgical History:  Past Surgical History:  "  Procedure Laterality Date     CORNEAL GRAFTS Bilateral      LAPAROSCOPIC HYSTERECTOMY TOTAL, BILATERAL SALPINGO-OOPHORECTOMY, NODE DISSECTION, COMBINED Bilateral 6/2/2021    Procedure: Total laparoscopic hysterectomy, bilateral salpingo-oophorectomy, bilateral sentinel lymph node dissection;  Surgeon: Saeid Casillas MD;  Location: [facility] OR     TONSILLECTOMY          Physical Exam:  BP (!) 145/74 (BP Location: Right arm)   Pulse 76   Temp 98.4  F (36.9  C) (Oral)   Ht 1.702 m (5' 7\")   Wt 73.6 kg (162 lb 3.2 oz)   SpO2 97%   BMI 25.40 kg/m     General appearance: no acute distress, well groomed, sitting comfortably   Abd: soft, non-tender, incisions without masses  : normal external genitalia, slight pallor of upper vagina, no stenosis, no lesions, no nodularity, bimanual exam with no masses      Pathology/labs:  N/a        Assessment:  Yvrose is a 67 year old with PMH notable for stroke with Stage 1A uterine papillary serous carcinoma s/p adjuvant chemotherapy and vaginal brachytherapy here for surveillance visit and exam. No concerns for recurrence.    Plan:  - Surveillance every 3 month for the first 2 years (11/2023) and then every 6 month follow-up until 5 years (11/2026). Per chart review, she is seeing her local medical oncologist every 3 months. Thus I think it most prudent for me to see her once a year for pelvic exams in lieu of one of those visits. If she were to develop vaginal bleeding or discharge such that there are concerns between visits, I am happy to see her in our clinic at any time.     - Vaginal dilators: given regular vaginal intercourse, no need to vaginal dilator use.    I spent a total of 30min on the care of Yvrose Andrade on the day of service.    Saeid Casillas MD     Gynecologic Oncology       Again, thank you for allowing me to participate in the care of your patient.        Sincerely,        Saeid Casillas MD    "

## 2022-05-16 NOTE — PROGRESS NOTES
Patient's port accessed using sterile procedure. Blood return noted. Lab tubes drawn, labeled and sent to lab. Port flushed with saline and heparin. Patient tolerated lab draw well.     Port de-accessed.  Mayelin Oreilly RN

## 2022-05-16 NOTE — PATIENT INSTRUCTIONS
Everything looks good on the exam today. There are no concerns for recurrence of the cancer.    You do not need to use the vaginal dilators that you brought with you. Based upon your exam and history, these are not necessary.     Continue to follow-up with your local cancer provider regularly and I will plan to replace on visit a year and have you come down to our clinic for a pelvic exam.     If there are any concerns between visits (such as vaginal bleeding, new discharge, etc), please contact us to be seen sooner.    I will prescribe an ointment for the skin on the outside of the vulva, as it looks irritated and like something called lichen sclerosus, which can be treated with steroid ointments. Use this twice a week to keep the skin healthier and prevent itching and irritation.

## 2022-06-07 ENCOUNTER — PATIENT OUTREACH (OUTPATIENT)
Dept: ONCOLOGY | Facility: CLINIC | Age: 67
End: 2022-06-07
Payer: COMMERCIAL

## 2022-08-28 ENCOUNTER — HEALTH MAINTENANCE LETTER (OUTPATIENT)
Age: 67
End: 2022-08-28

## 2023-01-08 ENCOUNTER — HEALTH MAINTENANCE LETTER (OUTPATIENT)
Age: 68
End: 2023-01-08

## 2023-04-23 ENCOUNTER — HEALTH MAINTENANCE LETTER (OUTPATIENT)
Age: 68
End: 2023-04-23

## 2023-06-01 ENCOUNTER — ONCOLOGY VISIT (OUTPATIENT)
Dept: ONCOLOGY | Facility: CLINIC | Age: 68
End: 2023-06-01
Payer: COMMERCIAL

## 2023-06-01 VITALS
WEIGHT: 169 LBS | DIASTOLIC BLOOD PRESSURE: 75 MMHG | BODY MASS INDEX: 26.53 KG/M2 | HEART RATE: 82 BPM | SYSTOLIC BLOOD PRESSURE: 129 MMHG | HEIGHT: 67 IN | OXYGEN SATURATION: 100 %

## 2023-06-01 DIAGNOSIS — C55 SEROUS ADENOCARCINOMA OF UTERUS (H): Primary | ICD-10-CM

## 2023-06-01 PROCEDURE — 99213 OFFICE O/P EST LOW 20 MIN: CPT | Performed by: OBSTETRICS & GYNECOLOGY

## 2023-06-01 ASSESSMENT — PAIN SCALES - GENERAL: PAINLEVEL: NO PAIN (0)

## 2023-06-01 NOTE — PATIENT INSTRUCTIONS
Everything looks good on your exam today.  No signs of cancer.  Continue to follow-up with your local oncologist according to their recommendations.  I will plan to see you every year to perform a pelvic exam.    Return to clinic in 1 year for exam.  If you have new symptoms such as vaginal bleeding or significant pelvic symptoms you should contact our clinic and we can schedule you to be seen sooner.

## 2023-06-01 NOTE — LETTER
6/1/2023         RE: Yvrose Andrade  [address] [facility] [address]        Dear Colleague,    Thank you for referring your patient, Yvrose Andrade, to the [facility]. Please see a copy of my visit note below.    Gynecologic Oncology Clinic - Established Patient Visit    Visit date: Jun 1, 2023     Reason for visit: uterine serous carcinoma follow-up    Interval history: Yvrose Andrade is a 68 year old with PMH notable for stroke with history of Stage 1A serous uterine cancer s/p adjuvant chemotherapy and vaginal brachytherapy.    She continues to follow locally with her cancer provider that gave her chemotherapy.  The plan has been for her to come once a year to complete a pelvic exam in our clinic and less symptoms dictate otherwise.  She is here for annual visit.    She is overall feeling well. No vaginal bleeding or discharge.  She does have vaginal intercourse and has no concerns with this.  No pelvic or abdominal pain. No change in health history.  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  She is working with physical therapy on this.      Oncology History  1) Presented with post-menopausal bleeding 4/2021.  2) Pelvic US on 5/5/21 w thickened heterogenous endometrial stripe, 1.3 to 1.5 cm w ?myometrial invasion.  3) Endometrial Bx on 5/17/21 w endometrial adenoCa, p53 over expression (>90%), consistent with serous  4) CT c/a/p on 6/1/21 w no pelvic / RP adenopathy or distant metastatic disease.  5) Lap hysterectomy, BSO, pelvic sentinel node dissection on 6/2/21 ().  6) Final path w 3.2 cm, unifocal serous Ca inv the lower uterine seg, 6 mm invasion into an 18 mm thick myometrium, no LVsI, no cervical stromal inv, benign ovaries and Fallopian tubes, 1/1 Lt obturator, 1/1 Rt obturator and 1/1 Lt ext iliac LN neg for tumor. HER 2 neg by FISH.  7) Adjuvant carboplatin + Taxol x 6 cycles b/w 7/20/21 - 11/2/21.  8)  "CT c/a/p on 11/22/21 w no evidence of recurrence.  8.3 on 11/22/21.  9) Radiation Oncology consult for vaginal cuff brachytherapy - 1/4/2022-1/14/2022 [facility] to the cuff.    Review of Systems:  As per HPI, otherwise non-contributory.     Past Surgical History:  Past Surgical History:   Procedure Laterality Date     CORNEAL GRAFTS Bilateral      LAPAROSCOPIC HYSTERECTOMY TOTAL, BILATERAL SALPINGO-OOPHORECTOMY, NODE DISSECTION, COMBINED Bilateral 6/2/2021    Procedure: Total laparoscopic hysterectomy, bilateral salpingo-oophorectomy, bilateral sentinel lymph node dissection;  Surgeon: Saeid Casillas MD;  Location: [facility] OR     TONSILLECTOMY          Physical Exam:  /75 (BP Location: Right arm, Patient Position: Chair, Cuff Size: Adult Regular)   Pulse 82   Ht 1.702 m (5' 7\")   Wt 76.7 kg (169 lb)   SpO2 100%   BMI 26.47 kg/m     General appearance: no acute distress, well groomed, sitting comfortably   Abd: soft, non-tender, incisions without masses  : normal external genitalia, pallor of upper vagina with notable vasculature secondary to radiation therapy, no stenosis, no lesions, no nodularity, bimanual exam with no masses      Pathology/labs:  N/a    Assessment:  Yvrose is a 68 year old with PMH notable for stroke with Stage 1A uterine papillary serous carcinoma s/p adjuvant chemotherapy and vaginal brachytherapy here for surveillance visit and exam. No concerns for recurrence.    Plan:  -Continue to follow-up with local oncologist as per their schedule.  I will continue to see her annually to perform a pelvic exam.  We reviewed symptoms that should prompt sooner return to our clinic including vaginal bleeding.  -Encouraged her to continue to work with physical therapy for symptoms of lower extremity weakness secondary to neuropathy from chemotherapy.    Return to clinic in 1 year with pelvic exam.    Saeid Casillas MD     Gynecologic Oncology       Again, thank you for " allowing me to participate in the care of your patient.        Sincerely,        Saeid Casillas MD

## 2023-06-01 NOTE — NURSING NOTE
"Oncology Rooming Note    June 1, 2023 9:00 AM   Yvrose Andrade is a 68 year old female who presents for:    Chief Complaint   Patient presents with     Oncology Clinic Visit     Uterine Cancer Follow Up     Initial Vitals: /75 (BP Location: Right arm, Patient Position: Chair, Cuff Size: Adult Regular)   Pulse 82   Ht 1.702 m (5' 7\")   Wt 76.7 kg (169 lb)   SpO2 100%   BMI 26.47 kg/m   Estimated body mass index is 26.47 kg/m  as calculated from the following:    Height as of this encounter: 1.702 m (5' 7\").    Weight as of this encounter: 76.7 kg (169 lb). Body surface area is 1.9 meters squared.  No Pain (0) Comment: Data Unavailable   No LMP recorded. Patient is postmenopausal.  Allergies reviewed: Yes  Medications reviewed: Yes    Medications: Medication refills not needed today.  Pharmacy name entered into [facility]: [facility] [address] [place] [address]    Clinical concerns: NO Dr. Casillas was notified.      Dary Case CMA              "

## 2023-06-01 NOTE — PROGRESS NOTES
Gynecologic Oncology Clinic - Established Patient Visit    Visit date: Jun 1, 2023     Reason for visit: uterine serous carcinoma follow-up    Interval history: Yvrose Andrade is a 68 year old with PMH notable for stroke with history of Stage 1A serous uterine cancer s/p adjuvant chemotherapy and vaginal brachytherapy.    She continues to follow locally with her cancer provider that gave her chemotherapy.  The plan has been for her to come once a year to complete a pelvic exam in our clinic and less symptoms dictate otherwise.  She is here for annual visit.    She is overall feeling well. No vaginal bleeding or discharge.  She does have vaginal intercourse and has no concerns with this.  No pelvic or abdominal pain. No change in health history.  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  She is working with physical therapy on this.      Oncology History  1) Presented with post-menopausal bleeding 4/2021.  2) Pelvic US on 5/5/21 w thickened heterogenous endometrial stripe, 1.3 to 1.5 cm w ?myometrial invasion.  3) Endometrial Bx on 5/17/21 w endometrial adenoCa, p53 over expression (>90%), consistent with serous  4) CT c/a/p on 6/1/21 w no pelvic / RP adenopathy or distant metastatic disease.  5) Lap hysterectomy, BSO, pelvic sentinel node dissection on 6/2/21 ().  6) Final path w 3.2 cm, unifocal serous Ca inv the lower uterine seg, 6 mm invasion into an 18 mm thick myometrium, no LVsI, no cervical stromal inv, benign ovaries and Fallopian tubes, 1/1 Lt obturator, 1/1 Rt obturator and 1/1 Lt ext iliac LN neg for tumor. HER 2 neg by FISH.  7) Adjuvant carboplatin + Taxol x 6 cycles b/w 7/20/21 - 11/2/21.  8) CT c/a/p on 11/22/21 w no evidence of recurrence.  8.3 on 11/22/21.  9) Radiation Oncology consult for vaginal cuff brachytherapy - 1/4/2022-1/14/2022 [facility] to the cuff.    Review of Systems:  As per HPI, otherwise non-contributory.     Past  "Surgical History:  Past Surgical History:   Procedure Laterality Date     CORNEAL GRAFTS Bilateral      LAPAROSCOPIC HYSTERECTOMY TOTAL, BILATERAL SALPINGO-OOPHORECTOMY, NODE DISSECTION, COMBINED Bilateral 6/2/2021    Procedure: Total laparoscopic hysterectomy, bilateral salpingo-oophorectomy, bilateral sentinel lymph node dissection;  Surgeon: Saeid Casillas MD;  Location: [facility] OR     TONSILLECTOMY          Physical Exam:  /75 (BP Location: Right arm, Patient Position: Chair, Cuff Size: Adult Regular)   Pulse 82   Ht 1.702 m (5' 7\")   Wt 76.7 kg (169 lb)   SpO2 100%   BMI 26.47 kg/m     General appearance: no acute distress, well groomed, sitting comfortably   Abd: soft, non-tender, incisions without masses  : normal external genitalia, pallor of upper vagina with notable vasculature secondary to radiation therapy, no stenosis, no lesions, no nodularity, bimanual exam with no masses      Pathology/labs:  N/a    Assessment:  Yvrose is a 68 year old with PMH notable for stroke with Stage 1A uterine papillary serous carcinoma s/p adjuvant chemotherapy and vaginal brachytherapy here for surveillance visit and exam. No concerns for recurrence.    Plan:  -Continue to follow-up with local oncologist as per their schedule.  I will continue to see her annually to perform a pelvic exam.  We reviewed symptoms that should prompt sooner return to our clinic including vaginal bleeding.  -Encouraged her to continue to work with physical therapy for symptoms of lower extremity weakness secondary to neuropathy from chemotherapy.    Return to clinic in 1 year with pelvic exam.    Saeid Casillas MD     Gynecologic Oncology   "

## 2023-07-16 ENCOUNTER — HEALTH MAINTENANCE LETTER (OUTPATIENT)
Age: 68
End: 2023-07-16

## 2023-09-24 ENCOUNTER — HEALTH MAINTENANCE LETTER (OUTPATIENT)
Age: 68
End: 2023-09-24

## 2023-12-03 ENCOUNTER — HEALTH MAINTENANCE LETTER (OUTPATIENT)
Age: 68
End: 2023-12-03

## 2024-02-11 ENCOUNTER — HEALTH MAINTENANCE LETTER (OUTPATIENT)
Age: 69
End: 2024-02-11

## 2024-06-30 ENCOUNTER — HEALTH MAINTENANCE LETTER (OUTPATIENT)
Age: 69
End: 2024-06-30

## 2024-07-23 NOTE — PROGRESS NOTES
[facility]:  Care Coordination Note    Received telephone call from patient's sister, Vandana, calling with questions pertaining to Genetic Testing.  Per sister, during her most recent telephone call with Dr. Casillas, the possibility of genetic testing (including testing for Mark Syndrome) was mentioned.  Sister asked if this is going to for sure be done, and if this is something that is done at the time of surgery or after.  She also has concerns over insurance coverage of the genetic testing, as she has a family member who underwent genetic testing in the past and ended up with a large bill.    Discussed with sister that per Dr. Casillas, it would be better to wait until after we have the final results from patient's surgery before proceeding with genetic testing.  Writer explained to patient the process of referring to Genetic Counseling, scheduling a consult for the patient, and that they will discuss with patient/family which genetic tests they would recommend based on her pathology, diagnosis, and personal/family history.  Also informed sister that the genetic counseling team would check into insurance coverage of any testing before it is done.  Sister verbalized understanding, no further questions at this time.     Nasir Barriga, RN, BSN, OCN  Oncology Care Coordinator  [facility]       Your labs are within acceptable range.

## 2024-10-17 NOTE — PROGRESS NOTES
[facility]:  Cancer Care Note    Received form for Long Term Disability (LTD) Benefits via fax from [employer], on 6/1/22.  Noted patient last saw Dr. Casillas on 5/16/22, is not currently on any type of active cancer treatment (finished chemotherapy in November 2021, and finished radiation treatment in January of 2022).  Patient is currently on a yearly surveillance schedule with Dr. Casillas.    Writer placed telephone call to patient to discuss what her work plans are.  Patient stated she has not returned to work since her cancer treatments, and does not plan on returning to work.  She states she is actually wanting to apply for early longterm.  Discussed with patient that, after review with the care team here, it would be more appropriate for this form and any other disability paperwork to come from her P[facility] Clinic, since she is no longer receiving any cancer treatments with us and is on surveillance.  Patient verbalized understanding, and requested to have the paperwork sent to Celina Eddy PA-C ([facility]).    Writer then placed telephone call to the office of Celina Eddy PA-C, spoke with the nursing staff there, and explained the situation.  They agreed to assist patient with getting these forms completed, and requested to have them faxed to their office at [phone].  Form was faxed to Celina's attention this morning.     Nasir Barriga, RN, BSN, OCN  RN Care Coordinator - Oncology  [facility]     (V5) oriented

## 2024-11-17 ENCOUNTER — HEALTH MAINTENANCE LETTER (OUTPATIENT)
Age: 69
End: 2024-11-17

## 2025-03-08 ENCOUNTER — HEALTH MAINTENANCE LETTER (OUTPATIENT)
Age: 70
End: 2025-03-08

## 2025-06-22 ENCOUNTER — HEALTH MAINTENANCE LETTER (OUTPATIENT)
Age: 70
End: 2025-06-22

## 2025-08-26 ENCOUNTER — ONCOLOGY VISIT (OUTPATIENT)
Dept: ONCOLOGY | Facility: CLINIC | Age: 70
End: 2025-08-26
Attending: OBSTETRICS & GYNECOLOGY
Payer: COMMERCIAL

## 2025-08-26 VITALS
OXYGEN SATURATION: 96 % | DIASTOLIC BLOOD PRESSURE: 76 MMHG | HEIGHT: 64 IN | WEIGHT: 184.5 LBS | SYSTOLIC BLOOD PRESSURE: 131 MMHG | HEART RATE: 64 BPM | BODY MASS INDEX: 31.5 KG/M2

## 2025-08-26 DIAGNOSIS — N90.4 LICHEN SCLEROSUS ET ATROPHICUS OF THE VULVA: ICD-10-CM

## 2025-08-26 DIAGNOSIS — L90.0 LICHEN SCLEROSUS: ICD-10-CM

## 2025-08-26 DIAGNOSIS — C54.1 ENDOMETRIAL ADENOCARCINOMA (H): Primary | ICD-10-CM

## 2025-08-26 PROCEDURE — G0463 HOSPITAL OUTPT CLINIC VISIT: HCPCS | Performed by: OBSTETRICS & GYNECOLOGY

## 2025-08-26 PROCEDURE — 99213 OFFICE O/P EST LOW 20 MIN: CPT | Performed by: OBSTETRICS & GYNECOLOGY

## 2025-08-26 RX ORDER — LIDOCAINE AND PRILOCAINE 25; 25 MG/G; MG/G
2.5 CREAM TOPICAL PRN
COMMUNITY

## 2025-08-26 RX ORDER — ERYTHROMYCIN 5 MG/G
OINTMENT OPHTHALMIC DAILY
COMMUNITY
Start: 2025-03-27

## 2025-08-26 RX ORDER — CLOBETASOL PROPIONATE 0.5 MG/G
OINTMENT TOPICAL
Qty: 45 G | Refills: 3 | Status: SHIPPED | OUTPATIENT
Start: 2025-08-26

## 2025-08-26 RX ORDER — PROPRANOLOL HYDROCHLORIDE 60 MG/1
60 CAPSULE, EXTENDED RELEASE ORAL 2 TIMES DAILY
COMMUNITY
Start: 2025-04-08

## 2025-08-26 ASSESSMENT — PAIN SCALES - GENERAL: PAINLEVEL_OUTOF10: NO PAIN (0)

## (undated) DEVICE — SUCTION MANIFOLD NEPTUNE 2 SYS 4 PORT 0702-020-000

## (undated) DEVICE — DRAPE SHEET MED 44X70" 9355

## (undated) DEVICE — SU STRATAFIX PDS PLUS 0 SPIRAL CT-1 9" 22CM SXPP1B455

## (undated) DEVICE — SOL NACL 0.9% IRRIG 1000ML BOTTLE 2F7124

## (undated) DEVICE — SU VICRYL 3-0 SH 27" UND J416H

## (undated) DEVICE — ENDO TROCAR FIRST ENTRY KII FIOS ADV FIX 05X100MM CFF03

## (undated) DEVICE — SYR 50ML LL W/O NDL 309653

## (undated) DEVICE — ENDO TROCAR FIRST ENTRY KII FIOS Z-THRD 05X100MM CTF03

## (undated) DEVICE — JELLY LUBRICATING SURGILUBE 2OZ TUBE

## (undated) DEVICE — PROTECTOR ARM ONE-STEP TRENDELENBURG 40418

## (undated) DEVICE — DRAPE MAYO STAND 23X54 8337

## (undated) DEVICE — ESU HARMONIC LAPAROSCOPIC SHEARS HD 1000I 36CM HARHD36

## (undated) DEVICE — Device

## (undated) DEVICE — SPECIMEN TRAP MUCOUS 40ML LUKI C30200A

## (undated) DEVICE — SU WND CLOSURE VLOC 180 ABS 0 9" GS-21 VLOCL0346

## (undated) DEVICE — ESU PENCIL W/COATED BLADE E2450H

## (undated) DEVICE — ENDO TROCAR SLEEVE KII ADV FIXATION 05X100MM CFS02

## (undated) DEVICE — APPLICATOR COTTON TIP 6"X2 STERILE LF 6012

## (undated) DEVICE — GLOVE PROTEXIS W/NEU-THERA 6.5  2D73TE65

## (undated) DEVICE — LINEN TOWEL PACK X6 WHITE 5487

## (undated) DEVICE — ESU ELEC CLEANCOAT LAP L-HOOK 36CM E3773-36C

## (undated) DEVICE — ESU GROUND PAD ADULT W/CORD E7507

## (undated) DEVICE — ESU HARMONIC ACE LAP SHEARS ETHICON ACE+ 7 5MMX36CM HARH36

## (undated) DEVICE — SYR 10ML LL W/O NDL 302995

## (undated) DEVICE — SUCTION IRR STRYKERFLOW II W/TIP 250-070-520

## (undated) DEVICE — BLADE CLIPPER SGL USE 9680

## (undated) DEVICE — SOL NACL 0.9% INJ 1000ML BAG 2B1324X

## (undated) DEVICE — LINEN TOWEL PACK X30 5481

## (undated) DEVICE — PREP CHLORAPREP 26ML TINTED ORANGE  260815

## (undated) DEVICE — RETR ELEV / UTERINE MANIPULATOR V-CARE MED CUP 60-6085-201A

## (undated) DEVICE — KIT PATIENT POSITIONING PIGAZZI LATEX FREE 40580

## (undated) DEVICE — SU MONOCRYL 4-0 PS-2 27" UND Y426H

## (undated) DEVICE — NDL SPINAL 22GA 3.5" QUINCKE 405181

## (undated) DEVICE — KIT PROCEDURE PINPOINT PP9036

## (undated) DEVICE — ENDO TROCAR OPTICAL ACCESS KII Z-THRD 08X100MM C0Q19

## (undated) DEVICE — KOH COLPOTOMIZER OCCLUDER  CPO-6

## (undated) DEVICE — NDL INSUFFLATION 13GA 120MM C2201

## (undated) DEVICE — ENDO TROCAR SLEEVE KII Z-THREADED 05X100MM CTS02

## (undated) DEVICE — DRSG PRIMAPORE 02X3" 7133

## (undated) DEVICE — GLOVE PROTEXIS BLUE W/NEU-THERA 7.0  2D73EB70

## (undated) DEVICE — WIPES FOLEY CARE SURESTEP PROVON DFC100

## (undated) RX ORDER — INDOCYANINE GREEN AND WATER 25 MG
KIT INJECTION
Status: DISPENSED
Start: 2021-06-02

## (undated) RX ORDER — HEPARIN SODIUM 5000 [USP'U]/.5ML
INJECTION, SOLUTION INTRAVENOUS; SUBCUTANEOUS
Status: DISPENSED
Start: 2021-06-02

## (undated) RX ORDER — ACETAMINOPHEN 325 MG/1
TABLET ORAL
Status: DISPENSED
Start: 2021-06-02

## (undated) RX ORDER — GABAPENTIN 300 MG/1
CAPSULE ORAL
Status: DISPENSED
Start: 2021-06-02

## (undated) RX ORDER — FENTANYL CITRATE 50 UG/ML
INJECTION, SOLUTION INTRAMUSCULAR; INTRAVENOUS
Status: DISPENSED
Start: 2021-06-02

## (undated) RX ORDER — IBUPROFEN 400 MG/1
TABLET, FILM COATED ORAL
Status: DISPENSED
Start: 2021-06-02

## (undated) RX ORDER — SODIUM CHLORIDE, SODIUM LACTATE, POTASSIUM CHLORIDE, CALCIUM CHLORIDE 600; 310; 30; 20 MG/100ML; MG/100ML; MG/100ML; MG/100ML
INJECTION, SOLUTION INTRAVENOUS
Status: DISPENSED
Start: 2021-06-02

## (undated) RX ORDER — HYDROMORPHONE HYDROCHLORIDE 1 MG/ML
INJECTION, SOLUTION INTRAMUSCULAR; INTRAVENOUS; SUBCUTANEOUS
Status: DISPENSED
Start: 2021-06-02

## (undated) RX ORDER — CEFAZOLIN SODIUM 2 G/100ML
INJECTION, SOLUTION INTRAVENOUS
Status: DISPENSED
Start: 2021-06-02